# Patient Record
Sex: FEMALE | Race: WHITE | NOT HISPANIC OR LATINO | Employment: PART TIME | URBAN - METROPOLITAN AREA
[De-identification: names, ages, dates, MRNs, and addresses within clinical notes are randomized per-mention and may not be internally consistent; named-entity substitution may affect disease eponyms.]

---

## 2018-01-04 ENCOUNTER — APPOINTMENT (OUTPATIENT)
Dept: MEDICAL GROUP | Facility: MEDICAL CENTER | Age: 63
End: 2018-01-04
Payer: COMMERCIAL

## 2018-01-10 ENCOUNTER — OFFICE VISIT (OUTPATIENT)
Dept: MEDICAL GROUP | Facility: MEDICAL CENTER | Age: 63
End: 2018-01-10
Payer: COMMERCIAL

## 2018-01-10 VITALS
RESPIRATION RATE: 14 BRPM | HEART RATE: 75 BPM | DIASTOLIC BLOOD PRESSURE: 98 MMHG | WEIGHT: 167.4 LBS | HEIGHT: 64 IN | TEMPERATURE: 98.2 F | OXYGEN SATURATION: 98 % | SYSTOLIC BLOOD PRESSURE: 134 MMHG | BODY MASS INDEX: 28.58 KG/M2

## 2018-01-10 DIAGNOSIS — R53.83 FATIGUE, UNSPECIFIED TYPE: ICD-10-CM

## 2018-01-10 DIAGNOSIS — Z12.31 SCREENING MAMMOGRAM, ENCOUNTER FOR: ICD-10-CM

## 2018-01-10 DIAGNOSIS — I10 ESSENTIAL HYPERTENSION: ICD-10-CM

## 2018-01-10 DIAGNOSIS — K58.0 IRRITABLE BOWEL SYNDROME WITH DIARRHEA: ICD-10-CM

## 2018-01-10 DIAGNOSIS — Z87.19 HISTORY OF ULCERATIVE COLITIS: ICD-10-CM

## 2018-01-10 PROCEDURE — 99204 OFFICE O/P NEW MOD 45 MIN: CPT | Performed by: FAMILY MEDICINE

## 2018-01-10 RX ORDER — CHLORTHALIDONE 25 MG/1
25 TABLET ORAL DAILY
Qty: 90 TAB | Refills: 3 | Status: SHIPPED | OUTPATIENT
Start: 2018-01-10 | End: 2019-04-25

## 2018-01-10 ASSESSMENT — PATIENT HEALTH QUESTIONNAIRE - PHQ9: CLINICAL INTERPRETATION OF PHQ2 SCORE: 0

## 2018-01-10 NOTE — LETTER
[facility]  Monae Daniels M.D.  [address]  [place] [address]  Fax: [phone]   Authorization for Release/Disclosure of   Protected Health Information   Name: HOANG GOMEZ : 1955 SSN: [id number]   Address: [address] [place] [address] Phone:    [phone] (home)    I authorize the entity listed below to release/disclose the PHI below to:   [facility]/Monae Daniels M.D. and Monae Daniels M.D.   Provider or Entity Name:  [facility]   Address   [facility], Zip            [address], [place] [address] Phone:  [phone]      Fax:       [phone]          Reason for request: continuity of care   Information to be released:    [ X ] LAST COLONOSCOPY,  including any PATH REPORT and follow-up  [ X ] LAST FIT/COLOGUARD RESULT [  ] LAST DEXA  [  ] LAST MAMMOGRAM  [  ] LAST PAP  [  ] LAST LABS [  ] RETINA EXAM REPORT  [  ] IMMUNIZATION RECORDS  [  ] Release all info      [  ] Check here and initial the line next to each item to release ALL health information INCLUDING  _____ Care and treatment for drug and / or alcohol abuse  _____ HIV testing, infection status, or AIDS  _____ Genetic Testing    DATES OF SERVICE OR TIME PERIOD TO BE DISCLOSED: _____________  I understand and acknowledge that:  * This Authorization may be revoked at any time by you in writing, except if your health information has already been used or disclosed.  * Your health information that will be used or disclosed as a result of you signing this authorization could be re-disclosed by the recipient. If this occurs, your re-disclosed health information may no longer be protected by State or Federal laws.  * You may refuse to sign this Authorization. Your refusal will not affect your ability to obtain treatment.  * This Authorization becomes effective upon signing and will  on (date) __________.      If no date is indicated, this Authorization will  one (1) year  from the signature date.    Name: Elin Ambriz    Signature:   Date:     1/10/2018       PLEASE FAX REQUESTED RECORDS BACK TO: [phone]

## 2018-01-11 NOTE — ASSESSMENT & PLAN NOTE
"This is a new problem. She does not have a recent primary care provider and has never been treated for this. She has not taken any home pressures, but she describes some dizziness and \"funny feeling\" in her head as well as shortness of breath with walking or going up stairs. She denies any chest pain, pressure, jaw pain or arm pain.    She reports feelings of anxiety and fatigue that have been going on for the same time period.   "

## 2018-01-11 NOTE — ASSESSMENT & PLAN NOTE
This is a chronic problem that has been going on for about one year. 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 She reports that she has some days that she does not want to get out of her pajamas. She still works full time in a job that requires her to be on her feet.

## 2018-01-11 NOTE — PROGRESS NOTES
"CC: Establish care and discuss the following chronic conditions: Hypertension, fatigue.      History of Present Illness: This is a 62 y.o. female new patient who presents today to discuss the chronic conditions outlined below. She reports that she has not had a primary care provider in many years because she has not had health insurance. She is now paying to be added on to her 's health insurance plan as she was concerned about her own blood pressure as well as feelings of fatigue.    Essential hypertension  This is a new problem. She does not have a recent primary care provider and has never been treated for this. She has not taken any home pressures, but she describes some dizziness and \"funny feeling\" in her head as well as shortness of breath with walking or going up stairs. She denies any chest pain, pressure, jaw pain or arm pain.    She reports feelings of anxiety and fatigue that have been going on for the same time period.     Fatigue  This is a chronic problem that has been going on for about one year. 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 She reports that she has some days that she does not want to get out of her pajamas. She still works full time in a job that requires her to be on her feet.       Patient Active Problem List    Diagnosis Date Noted   • Essential hypertension 01/10/2018   • History of ulcerative colitis 01/10/2018   • Irritable bowel syndrome with diarrhea 01/10/2018   • Fatigue 01/10/2018      Allergies:Patient has no known allergies.    Current Outpatient Prescriptions   Medication Sig Dispense Refill   • chlorthalidone (HYGROTON) 25 MG Tab Take 1 Tab by mouth every day. 90 Tab 3     No current facility-administered medications for this visit.        Social History   Substance Use Topics   • Smoking status: Never Smoker   • Smokeless tobacco: Never Used   • Alcohol use No     Social History     Social History " "Narrative   • No narrative on file       Family History   Problem Relation Age of Onset   • Cancer Mother      lymphoma   • Psychiatry Father      depression, PTSD   • Heart Disease Father    • Stroke Father    • Breast Cancer Maternal Aunt 70       Review of Systems:   Constitutional: Positive for fatigue. Negative for fever, chills, unexpected weight change, and fatigue/malaise and generalized weakness.   HEENT: Negative for headaches, vision changes, hearing changes, ear pain, ear discharge, rhinorrhea, sinus congestion, sore throat, and neck pain.   Respiratory: Positive for dyspnea with exertion. Negative for cough, sputum production, chest congestion, wheezing, and crackles.   Cardiovascular: Negative for chest pain, palpitations, orthopnea, and bilateral lower extremity edema.   Gastrointestinal: Negative for heartburn, nausea, vomiting, abdominal pain, hematochezia, melena, diarrhea, constipation, and greasy/foul-smelling stools.   Genitourinary: Negative for dysuria, polyuria, hematuria, pyuria, urinary urgency, and urinary incontinence.  Musculoskeletal: Negative for myalgias, back pain, and joint pain.   Skin: Negative for rash, itching, cyanotic skin color change.   Neurological: Positive for feelings of \"internal tremors\" when she wakes up from a dream, dizziness. Negative for tingling, tremors, focal sensory deficit, focal weakness and headaches.   Endo/Heme/Allergies: Does not bruise/bleed easily.   Psychiatric/Behavioral: Negative for depression, suicidal/homicidal ideation and memory loss.        - NOTE: All other systems reviewed and are negative, except as in HPI.      Exam:    Blood pressure 134/98, pulse 75, temperature 36.8 °C (98.2 °F), resp. rate 14, height 1.626 m (5' 4\"), weight 75.9 kg (167 lb 6.4 oz), SpO2 98 %, not currently breastfeeding.   Body mass index is 28.73 kg/m².    General: Normal appearing. No distress.  HEENT: Normocephalic. Conjunctiva clear, lids without ptosis, pupils " "equal and reactive to light, ears normal shape and contour, canals are clear bilaterally, tympanic membranes are benign, nasal mucosa benign, oropharynx is without erythema, edema or exudates.   Neck: Supple without JVD or bruit. Thyroid is not enlarged.  Pulmonary: Clear to ausculation.  Normal effort. No rales, ronchi, or wheezing.  Cardiovascular: Regular rate and rhythm without murmur. Carotid and radial pulses are intact and equal bilaterally.  Abdomen: Soft, nontender, nondistended. Normal bowel sounds. Liver and spleen are not palpable  Neurologic: Grossly non-focal, DTRs intact  Lymph: No cervical, supraclavicular or axillary lymph nodes are palpable  Skin: Warm and dry.  No obvious lesions.  Musculoskeletal: Normal gait. No extremity cyanosis, clubbing, or edema.  Psych: Normal mood and affect. Alert and oriented x3. Judgment and insight is normal.      EKG Interpretation   Interpreted by me   Rhythm: normal sinus   Rate: normal   Axis: normal   Ectopy: none   Conduction: normal   ST Segments: no acute change   T Waves: no acute change   Q Waves: none   Clinical Impression: no acute changes, normal EKG    Health Maintenance: Mammogram ordered today, she had a colonoscopy and EGD done at [facility] in 2015. We will request those records today.    Assessment/Plan:  1. Essential hypertension  This is a new problem that has not previously been managed. EKG done in the clinic today shows no acute changes. We will start her on a daily blood pressure control medication today and follow-up in 4 weeks.  - COMP METABOLIC PANEL; Future  - EKG - Clinic performed  - LIPID PROFILE; Future  -chlorthalidone (HYGROTON) 25 MG Tab once daily    2. History of ulcerative colitis  This is a chronic problem that \"resolved\" per colonoscopy done in 2015 at [facility]. She reports no further problems with this.    3. Irritable bowel syndrome with diarrhea  This is a chronic problem that is controlled with current " "lifestyle measures. She reports that she will occasionally have \"attacks\" but these are infrequent.    4. Fatigue, unspecified type  This is a chronic problem that is uncontrolled with current lifestyle measures. She denies any family history of thyroid problems in does not think that her thyroid has ever been investigated.   - TSH WITH REFLEX TO FT4; Future    5. Screening mammogram, encounter for  - MA-SCREEN MAMMO W/CAD-BILAT; Future    Vicky Saez PA-C and I completed this visit and chart together as part of her training in [employer].  Monae Daniels MD  Provider [place]/educator  [facility]        Please note that this dictation was created using voice recognition software. I have made every reasonable attempt to correct obvious errors, but I expect that there are errors of grammar and possibly content that I did not discover before finalizing the note.       "

## 2018-01-12 ENCOUNTER — HOSPITAL ENCOUNTER (OUTPATIENT)
Dept: LAB | Facility: MEDICAL CENTER | Age: 63
End: 2018-01-12
Attending: FAMILY MEDICINE
Payer: COMMERCIAL

## 2018-01-12 DIAGNOSIS — I10 ESSENTIAL HYPERTENSION: ICD-10-CM

## 2018-01-12 DIAGNOSIS — R53.83 FATIGUE, UNSPECIFIED TYPE: ICD-10-CM

## 2018-01-12 LAB
ALBUMIN SERPL BCP-MCNC: 4.3 G/DL (ref 3.2–4.9)
ALBUMIN/GLOB SERPL: 1.6 G/DL
ALP SERPL-CCNC: 75 U/L (ref 30–99)
ALT SERPL-CCNC: 15 U/L (ref 2–50)
ANION GAP SERPL CALC-SCNC: 4 MMOL/L (ref 0–11.9)
AST SERPL-CCNC: 15 U/L (ref 12–45)
BILIRUB SERPL-MCNC: 0.9 MG/DL (ref 0.1–1.5)
BUN SERPL-MCNC: 18 MG/DL (ref 8–22)
CALCIUM SERPL-MCNC: 9.2 MG/DL (ref 8.5–10.5)
CHLORIDE SERPL-SCNC: 104 MMOL/L (ref 96–112)
CHOLEST SERPL-MCNC: 284 MG/DL (ref 100–199)
CO2 SERPL-SCNC: 28 MMOL/L (ref 20–33)
CREAT SERPL-MCNC: 0.59 MG/DL (ref 0.5–1.4)
GLOBULIN SER CALC-MCNC: 2.7 G/DL (ref 1.9–3.5)
GLUCOSE SERPL-MCNC: 88 MG/DL (ref 65–99)
HDLC SERPL-MCNC: 71 MG/DL
LDLC SERPL CALC-MCNC: 194 MG/DL
POTASSIUM SERPL-SCNC: 4.2 MMOL/L (ref 3.6–5.5)
PROT SERPL-MCNC: 7 G/DL (ref 6–8.2)
SODIUM SERPL-SCNC: 136 MMOL/L (ref 135–145)
TRIGL SERPL-MCNC: 95 MG/DL (ref 0–149)
TSH SERPL DL<=0.005 MIU/L-ACNC: 1.74 UIU/ML (ref 0.38–5.33)

## 2018-01-12 PROCEDURE — 80061 LIPID PANEL: CPT

## 2018-01-12 PROCEDURE — 84443 ASSAY THYROID STIM HORMONE: CPT

## 2018-01-12 PROCEDURE — 80053 COMPREHEN METABOLIC PANEL: CPT

## 2018-01-12 PROCEDURE — 36415 COLL VENOUS BLD VENIPUNCTURE: CPT

## 2018-01-17 ENCOUNTER — HOSPITAL ENCOUNTER (OUTPATIENT)
Dept: RADIOLOGY | Facility: MEDICAL CENTER | Age: 63
End: 2018-01-17

## 2018-01-18 ENCOUNTER — HOSPITAL ENCOUNTER (OUTPATIENT)
Dept: RADIOLOGY | Facility: MEDICAL CENTER | Age: 63
End: 2018-01-18
Attending: FAMILY MEDICINE
Payer: COMMERCIAL

## 2018-01-18 DIAGNOSIS — Z12.31 SCREENING MAMMOGRAM, ENCOUNTER FOR: ICD-10-CM

## 2018-01-18 PROCEDURE — 77067 SCR MAMMO BI INCL CAD: CPT

## 2018-02-12 ENCOUNTER — OFFICE VISIT (OUTPATIENT)
Dept: MEDICAL GROUP | Facility: MEDICAL CENTER | Age: 63
End: 2018-02-12
Payer: COMMERCIAL

## 2018-02-12 VITALS
HEIGHT: 64 IN | WEIGHT: 168.6 LBS | HEART RATE: 60 BPM | TEMPERATURE: 97.2 F | DIASTOLIC BLOOD PRESSURE: 70 MMHG | OXYGEN SATURATION: 97 % | RESPIRATION RATE: 14 BRPM | SYSTOLIC BLOOD PRESSURE: 116 MMHG | BODY MASS INDEX: 28.79 KG/M2

## 2018-02-12 DIAGNOSIS — E78.00 HYPERCHOLESTEROLEMIA: ICD-10-CM

## 2018-02-12 DIAGNOSIS — I10 ESSENTIAL HYPERTENSION: ICD-10-CM

## 2018-02-12 DIAGNOSIS — K58.0 IRRITABLE BOWEL SYNDROME WITH DIARRHEA: ICD-10-CM

## 2018-02-12 PROCEDURE — 99214 OFFICE O/P EST MOD 30 MIN: CPT | Performed by: FAMILY MEDICINE

## 2018-02-12 RX ORDER — ATORVASTATIN CALCIUM 20 MG/1
20 TABLET, FILM COATED ORAL DAILY
Qty: 90 TAB | Refills: 3 | Status: SHIPPED | OUTPATIENT
Start: 2018-02-12 | End: 2019-04-25

## 2018-02-12 NOTE — ASSESSMENT & PLAN NOTE
This is a chronic problem that recently has been worse. Patient states that she is having increasing gas along with the loose stool. We talked about utilizing probiotics to see if that would be helpful. She is going to give that a try. She is also given a try and make some lifestyle changes to help her cholesterol

## 2018-02-12 NOTE — PROGRESS NOTES
CC: Hypertension and hypercholesterolemia    HISTORY OF PRESENT ILLNESS: Patient is a 62 y.o. female established patient who presents today to discuss her chronic health problems as outlined below. Patient and her blood work prior to the visit. She does tell me that since she started on the chlorthalidone that her blood pressures have been excellent. She did look at her lab results and is wondering about trying to get her cholesterol under control to dietary changes. We will give her some information to read.    Health Maintenance: Patient will do her fit test    Essential hypertension  This is a chronic health problem that is uncontrolled with current medications and lifestyle measures.  The patient denies chest pain, shortness of breath or dyspnea on exertion.            Hypercholesterolemia  This is a chronic health problem that was found and the patient did her blood work. We did a lipid panel because she has hypertension and her total cholesterol comes back elevated at 284, triglycerides 95, HDL excellent at 71 but her LDL is elevated 194. We talked about food choices. She admits that she likes creamy sayings such as half-and-half in her coffee cheese etc. We talked about the fact that she has to make some lifestyle changes which should also include walking for at least 30 minutes a day eating more chicken and fish, and starting a statin at this time. We will do it for the next 3 months to recheck her blood work at the end of that time. We will also have her start taking a baby aspirin daily    Irritable bowel syndrome with diarrhea  This is a chronic problem that recently has been worse. Patient states that she is having increasing gas along with the loose stool. We talked about utilizing probiotics to see if that would be helpful. She is going to give that a try. She is also given a try and make some lifestyle changes to help her cholesterol      Patient Active Problem List    Diagnosis Date Noted   •  Hypercholesterolemia 02/12/2018   • Essential hypertension 01/10/2018   • History of ulcerative colitis 01/10/2018   • Irritable bowel syndrome with diarrhea 01/10/2018   • Fatigue 01/10/2018      Allergies:Patient has no known allergies.    Current Outpatient Prescriptions   Medication Sig Dispense Refill   • atorvastatin (LIPITOR) 20 MG Tab Take 1 Tab by mouth every day. 90 Tab 3   • aspirin EC (ECOTRIN) 81 MG Tablet Delayed Response Take 1 Tab by mouth every day.     • chlorthalidone (HYGROTON) 25 MG Tab Take 1 Tab by mouth every day. 90 Tab 3     No current facility-administered medications for this visit.        Social History   Substance Use Topics   • Smoking status: Never Smoker   • Smokeless tobacco: Never Used   • Alcohol use No     Social History     Social History Narrative   • No narrative on file       Family History   Problem Relation Age of Onset   • Cancer Mother      lymphoma   • Psychiatry Father      depression, PTSD   • Heart Disease Father    • Stroke Father    • Breast Cancer Maternal Aunt 70       Review of Systems:      - Constitutional: Negative for fever, chills, unexpected weight change, and fatigue/generalized weakness.     - HEENT: Negative for headaches, vision changes, hearing changes, ear pain, ear discharge, rhinorrhea, sinus congestion, sore throat, and neck pain.      - Respiratory: Negative for cough, sputum production, chest congestion, dyspnea, wheezing, and crackles.      - Cardiovascular: Negative for chest pain, palpitations, orthopnea, and bilateral lower extremity edema.     - Gastrointestinal: Increasing problem with flatulence and continued irritable bowel with loose stool      - Genitourinary: Negative for dysuria, polyuria, hematuria, pyuria, urinary urgency, and urinary incontinence.    - Musculoskeletal: Negative for myalgias, back pain, and joint pain.     - Skin: Negative for rash, itching, cyanotic skin color change.     - Neurological: Negative for dizziness,  "tingling, tremors, focal sensory deficit, focal weakness and headaches.     - Endo/Heme/Allergies: Does not bruise/bleed easily.     - Psychiatric/Behavioral: Negative for depression, suicidal/homicidal ideation and memory loss.          - NOTE: All other systems reviewed and are negative, except as in HPI.    Exam:    Blood pressure 116/70, pulse 60, temperature 36.2 °C (97.2 °F), resp. rate 14, height 1.626 m (5' 4\"), weight 76.5 kg (168 lb 9.6 oz), SpO2 97 %, not currently breastfeeding. Body mass index is 28.94 kg/m².    General:  Well nourished, well developed female in NAD  LABS: 1/12/18: Results reviewed and discussed with the patient, questions answered.    Patient was seen for 25 minutes face to face of which, 20 minutes was spent counseling regarding the above mentioned problems.  Assessment/Plan:  1. Essential hypertension  Controlled, continue with current meds and lifestyle.      2. Hypercholesterolemia  This problem is uncontrolled and we discussed that she could work on dietary management. While she is working on that and wanted her to start on a statin as well as a daily 81 mg aspirin. She is willing to do this. We will see her back in 3 months with labs taken prior to the visit.  - COMP METABOLIC PANEL; Future  - LIPID PROFILE; Future    3. Irritable bowel syndrome with diarrhea  Patient going to try adding a probiotic over-the-counter to see if this helps with the flatulence and the IBS.        "

## 2018-02-12 NOTE — ASSESSMENT & PLAN NOTE
This is a chronic health problem that was found and the patient did her blood work. We did a lipid panel because she has hypertension and her total cholesterol comes back elevated at 284, triglycerides 95, HDL excellent at 71 but her LDL is elevated 194. We talked about food choices. She admits that she likes creamy sayings such as half-and-half in her coffee cheese etc. We talked about the fact that she has to make some lifestyle changes which should also include walking for at least 30 minutes a day eating more chicken and fish, and starting a statin at this time. We will do it for the next 3 months to recheck her blood work at the end of that time. We will also have her start taking a baby aspirin daily

## 2018-04-24 ENCOUNTER — OFFICE VISIT (OUTPATIENT)
Dept: MEDICAL GROUP | Facility: MEDICAL CENTER | Age: 63
End: 2018-04-24
Payer: COMMERCIAL

## 2018-04-24 VITALS
OXYGEN SATURATION: 97 % | HEART RATE: 68 BPM | DIASTOLIC BLOOD PRESSURE: 68 MMHG | SYSTOLIC BLOOD PRESSURE: 104 MMHG | WEIGHT: 171.96 LBS | HEIGHT: 64 IN | TEMPERATURE: 98.1 F | BODY MASS INDEX: 29.36 KG/M2

## 2018-04-24 DIAGNOSIS — G57.60 MORTON'S NEUROMA, UNSPECIFIED LATERALITY: ICD-10-CM

## 2018-04-24 DIAGNOSIS — M25.562 CHRONIC PAIN OF LEFT KNEE: ICD-10-CM

## 2018-04-24 DIAGNOSIS — H91.91 DECREASED HEARING OF RIGHT EAR: ICD-10-CM

## 2018-04-24 DIAGNOSIS — G89.29 CHRONIC PAIN OF LEFT KNEE: ICD-10-CM

## 2018-04-24 PROCEDURE — 99214 OFFICE O/P EST MOD 30 MIN: CPT | Performed by: PHYSICIAN ASSISTANT

## 2018-04-24 NOTE — ASSESSMENT & PLAN NOTE
This is a 62-year-old female who is here today to discuss decreased hearing of her bilateral ears but specifically the right ear. She was seen at [facility] and told she needed wax removed. She been putting some baby oil in it. Denies any ear pain.

## 2018-04-24 NOTE — ASSESSMENT & PLAN NOTE
She also complains of a chronic history of a least 10 years of left knee pain. Recently the pain as gotten worse. There is swelling in the medial aspect of the knee. It does improve in time. She limps. She denies any buckling. No medication is effective.

## 2018-04-24 NOTE — PROGRESS NOTES
Subjective:   Elin Ambriz is a 62 y.o. female here today for decreased hearing out of her right ear as well as chronic left knee pain.    Decreased hearing of right ear  This is a 62-year-old female who is here today to discuss decreased hearing of her bilateral ears but specifically the right ear. She was seen at [facility] and told she needed wax removed. She been putting some baby oil in it. Denies any ear pain.    Pagan's neuroma, unspecified laterality  Also complains of a chronic history of bilateral feet pain secondary to Pagan's neuroma. She saw podiatry in [place] and had injections a few weeks ago which actually causes pain. He has not been alleviated. She states it is difficult to walk. She is gaining weight because of that.    Chronic pain of left knee  She also complains of a chronic history of a least 10 years of left knee pain. Recently the pain as gotten worse. There is swelling in the medial aspect of the knee. It does improve in time. She limps. She denies any buckling. No medication is effective.       Current medicines (including changes today)  Current Outpatient Prescriptions   Medication Sig Dispense Refill   • atorvastatin (LIPITOR) 20 MG Tab Take 1 Tab by mouth every day. 90 Tab 3   • chlorthalidone (HYGROTON) 25 MG Tab Take 1 Tab by mouth every day. 90 Tab 3   • aspirin EC (ECOTRIN) 81 MG Tablet Delayed Response Take 1 Tab by mouth every day.       No current facility-administered medications for this visit.      She  has a past medical history of Essential hypertension (1/10/2018); Fatigue (1/10/2018); History of ulcerative colitis (1/10/2018); Hypercholesterolemia (2/12/2018); and Irritable bowel syndrome with diarrhea (1/10/2018).    Social History and Family History were reviewed and updated.    ROS   No chest pain, no shortness of breath, no abdominal pain and all other systems were reviewed and are negative.       Objective:     Blood pressure 104/68, pulse 68, temperature 36.7  "°C (98.1 °F), height 1.626 m (5' 4\"), weight 78 kg (171 lb 15.3 oz), SpO2 97 %. Body mass index is 29.52 kg/m².   Physical Exam:  Constitutional: Alert, no distress.  Skin: Warm, dry, good turgor, no rashes in visible areas.  Eye: Equal, round and reactive, conjunctiva clear, lids normal.  ENMT: Lips without lesions, good dentition, oropharynx clear. Right ear with cerumen impaction that was removed successfully with a lavage revealing a clear TM.  Neck: Trachea midline, no masses.   Lymph: No cervical or supraclavicular lymphadenopathy  Respiratory: Unlabored respiratory effort, lungs clear to auscultation, no wheezes, no ronchi.  Cardiovascular: Normal S1, S2, no murmur, no edema.  Musculoskeletal: Bilateral knees appear symmetrical except there is a slight area of a possible effusion of the left knee on the medial aspect. Range of motion is good.  Psych: Alert and oriented x3, normal affect and mood.        Assessment and Plan:   The following treatment plan was discussed    1. Decreased hearing of right ear  Acute, new onset condition. Lavage successfully removed earwax. Advised to follow up annually to have ears evaluated. May return to [facility]. She has hearing aids advised may pack a wax in the right ear. Follow-up as needed.    2. Pagan's neuroma, unspecified laterality  Chronic condition. Uncontrolled. Referred to podiatry and Dr. Meier to establish care.  - REFERRAL TO PODIATRY    3. Chronic pain of left knee  Newly placed on problem list but a chronic condition. Referred to orthopedics to establish care. If she may have some internal derangement of the left knee.  - REFERRAL TO ORTHOPEDICS    Total 25 minutes face-to-face time spent with patient, with greater than 50% of the total time discussing patient's issues and symptoms as listed above in assessment and plan, as well as managing coordination of care for future evaluation and treatment of cerumen impaction, left knee pain and bilateral Pagan's " neuromas..      Followup: Return if symptoms worsen or fail to improve.    Please note that this dictation was created using voice recognition software. I have made every reasonable attempt to correct obvious errors, but I expect that there are errors of grammar and possibly content that I did not discover before finalizing the note.

## 2018-04-24 NOTE — ASSESSMENT & PLAN NOTE
Also complains of a chronic history of bilateral feet pain secondary to Pagan's neuroma. She saw podiatry in [place] and had injections a few weeks ago which actually causes pain. He has not been alleviated. She states it is difficult to walk. She is gaining weight because of that.

## 2018-04-24 NOTE — LETTER
[facility]  Monae Daniels M.D.  [address]  [place] [address]  Fax: [phone]   Authorization for Release/Disclosure of   Protected Health Information   Name: HOANG AMBRIZ : 1955 SSN: [id number]   Address: [address] [place] [address] [address]  [place] [address] Phone:    [phone] (home)    I authorize the entity listed below to release/disclose the PHI below to:   [facility]/Monae Daniels M.D. and Franco Fitzgerald P.A.-C.   Provider or Entity Name:  [facility]   Address   City, State, Zip   Phone:      Fax:     Reason for request: continuity of care   Information to be released:    [ X ] LAST COLONOSCOPY,  including any PATH REPORT and follow-up  [  ] LAST FIT/COLOGUARD RESULT [  ] LAST DEXA  [  ] LAST MAMMOGRAM  [  ] LAST PAP  [  ] LAST LABS [  ] RETINA EXAM REPORT  [  ] IMMUNIZATION RECORDS  [  ] Release all info      [  ] Check here and initial the line next to each item to release ALL health information INCLUDING  _____ Care and treatment for drug and / or alcohol abuse  _____ HIV testing, infection status, or AIDS  _____ Genetic Testing    DATES OF SERVICE OR TIME PERIOD TO BE DISCLOSED: _____________  I understand and acknowledge that:  * This Authorization may be revoked at any time by you in writing, except if your health information has already been used or disclosed.  * Your health information that will be used or disclosed as a result of you signing this authorization could be re-disclosed by the recipient. If this occurs, your re-disclosed health information may no longer be protected by State or Federal laws.  * You may refuse to sign this Authorization. Your refusal will not affect your ability to obtain treatment.  * This Authorization becomes effective upon signing and will  on (date) __________.      If no date is indicated, this Authorization will  one (1) year from the signature date.    Name: Hoang Ambriz    Signature:   Date:     2018       PLEASE FAX  REQUESTED RECORDS BACK TO: [phone]

## 2018-07-10 ENCOUNTER — HOSPITAL ENCOUNTER (OUTPATIENT)
Dept: LAB | Facility: MEDICAL CENTER | Age: 63
End: 2018-07-10
Attending: ANESTHESIOLOGY
Payer: COMMERCIAL

## 2018-07-10 LAB
ANION GAP SERPL CALC-SCNC: 9 MMOL/L (ref 0–11.9)
BUN SERPL-MCNC: 15 MG/DL (ref 8–22)
CALCIUM SERPL-MCNC: 9.5 MG/DL (ref 8.5–10.5)
CHLORIDE SERPL-SCNC: 104 MMOL/L (ref 96–112)
CO2 SERPL-SCNC: 25 MMOL/L (ref 20–33)
CREAT SERPL-MCNC: 0.68 MG/DL (ref 0.5–1.4)
GLUCOSE SERPL-MCNC: 74 MG/DL (ref 65–99)
HCT VFR BLD AUTO: 43.6 % (ref 37–47)
POTASSIUM SERPL-SCNC: 4.3 MMOL/L (ref 3.6–5.5)
SODIUM SERPL-SCNC: 138 MMOL/L (ref 135–145)

## 2018-07-10 PROCEDURE — 80048 BASIC METABOLIC PNL TOTAL CA: CPT

## 2018-07-10 PROCEDURE — 85014 HEMATOCRIT: CPT

## 2018-07-10 PROCEDURE — 36415 COLL VENOUS BLD VENIPUNCTURE: CPT

## 2018-08-03 ENCOUNTER — HOSPITAL ENCOUNTER (OUTPATIENT)
Dept: LAB | Facility: MEDICAL CENTER | Age: 63
End: 2018-08-03
Attending: FAMILY MEDICINE
Payer: COMMERCIAL

## 2018-08-03 DIAGNOSIS — E78.00 HYPERCHOLESTEROLEMIA: ICD-10-CM

## 2018-08-03 LAB
ALBUMIN SERPL BCP-MCNC: 4.4 G/DL (ref 3.2–4.9)
ALBUMIN/GLOB SERPL: 1.7 G/DL
ALP SERPL-CCNC: 68 U/L (ref 30–99)
ALT SERPL-CCNC: 11 U/L (ref 2–50)
ANION GAP SERPL CALC-SCNC: 7 MMOL/L (ref 0–11.9)
AST SERPL-CCNC: 18 U/L (ref 12–45)
BILIRUB SERPL-MCNC: 1.2 MG/DL (ref 0.1–1.5)
BUN SERPL-MCNC: 21 MG/DL (ref 8–22)
CALCIUM SERPL-MCNC: 9.4 MG/DL (ref 8.5–10.5)
CHLORIDE SERPL-SCNC: 106 MMOL/L (ref 96–112)
CHOLEST SERPL-MCNC: 242 MG/DL (ref 100–199)
CO2 SERPL-SCNC: 26 MMOL/L (ref 20–33)
CREAT SERPL-MCNC: 0.71 MG/DL (ref 0.5–1.4)
GLOBULIN SER CALC-MCNC: 2.6 G/DL (ref 1.9–3.5)
GLUCOSE SERPL-MCNC: 81 MG/DL (ref 65–99)
HDLC SERPL-MCNC: 69 MG/DL
LDLC SERPL CALC-MCNC: 158 MG/DL
POTASSIUM SERPL-SCNC: 4.2 MMOL/L (ref 3.6–5.5)
PROT SERPL-MCNC: 7 G/DL (ref 6–8.2)
SODIUM SERPL-SCNC: 139 MMOL/L (ref 135–145)
TRIGL SERPL-MCNC: 74 MG/DL (ref 0–149)

## 2018-08-03 PROCEDURE — 80053 COMPREHEN METABOLIC PANEL: CPT

## 2018-08-03 PROCEDURE — 80061 LIPID PANEL: CPT

## 2018-08-03 PROCEDURE — 36415 COLL VENOUS BLD VENIPUNCTURE: CPT

## 2018-08-13 ENCOUNTER — APPOINTMENT (OUTPATIENT)
Dept: MEDICAL GROUP | Facility: MEDICAL CENTER | Age: 63
End: 2018-08-13
Payer: COMMERCIAL

## 2018-08-16 ENCOUNTER — HOSPITAL ENCOUNTER (OUTPATIENT)
Facility: MEDICAL CENTER | Age: 63
End: 2018-08-16
Attending: OBSTETRICS & GYNECOLOGY
Payer: COMMERCIAL

## 2018-08-16 ENCOUNTER — GYNECOLOGY VISIT (OUTPATIENT)
Dept: OBGYN | Facility: MEDICAL CENTER | Age: 63
End: 2018-08-16
Payer: COMMERCIAL

## 2018-08-16 VITALS
SYSTOLIC BLOOD PRESSURE: 119 MMHG | HEIGHT: 64 IN | DIASTOLIC BLOOD PRESSURE: 80 MMHG | BODY MASS INDEX: 28.51 KG/M2 | WEIGHT: 167 LBS

## 2018-08-16 DIAGNOSIS — Z11.51 SCREENING FOR HUMAN PAPILLOMAVIRUS (HPV): ICD-10-CM

## 2018-08-16 DIAGNOSIS — Z12.4 PAP SMEAR FOR CERVICAL CANCER SCREENING: ICD-10-CM

## 2018-08-16 DIAGNOSIS — Z01.419 WELL WOMAN EXAM WITH ROUTINE GYNECOLOGICAL EXAM: ICD-10-CM

## 2018-08-16 PROCEDURE — 99386 PREV VISIT NEW AGE 40-64: CPT | Performed by: OBSTETRICS & GYNECOLOGY

## 2018-08-16 PROCEDURE — 88175 CYTOPATH C/V AUTO FLUID REDO: CPT

## 2018-08-16 PROCEDURE — 87624 HPV HI-RISK TYP POOLED RSLT: CPT

## 2018-08-16 NOTE — PROGRESS NOTES
"Subjective:      Elin Ambriz is a 62 y.o. female who presents with New Patient (Annual exam)        CC: annual exam    HPI: 61 yo  postmenopausal female presents for annual exam.  No complaints today.  Last mammogram  - wnl.  Last pap ? 10 years ago.  No hx of abnormal pap smears.     ROS REVIEW OF SYSTEMS:    Pertinent positives and negatives mentioned in HPI.    All other systems reviewed and are negative or noncontributory.       Objective:     /80   Ht 1.626 m (5' 4\")   Wt 75.8 kg (167 lb)   Breastfeeding? No   BMI 28.67 kg/m²      Physical Exam      GENERAL: Alert, in no apparent distress  PSYCHIATRIC: Appropriate affect, intact insight and judgement.  NECK:  Nontender, no masses.  No Thyromegaly or nodules. No lymphadenopathy.  RESPIRATORY: Normal respiratory effort.  Lungs clear to auscultation.   CARDIOVASCULAR: RRR, no murmur, gallop, or rub.  ABDOMEN: Soft, nontender, nondistended.  No palpable masses.  No rebound or guarding.  No inguinal lymphadenopathy.  No hepatosplenomegaly.  No hernias.  BACK: No CVA tenderness  EXTREMITIES: No edema  SKIN: No rash    BREAST: No masses or tenderness.  No skin changes.  No nipple inversion or discharge. No axillary lymphadenopathy.      GENITOURINARY:  Normal external genitalia, no lesions.  Normal urethral meatus, no masses or tenderness.  Normal bladder without fullness or masses.  Vagina atrophic, no vaginal discharge or lesions.  Cervix without lesions or discharge, nontender.  Uterus normal size, shape, and contour, nontender.  Adnexa nontender, no masses.  Normal anus and perineum.    Rectal Exam - not indicated.       Assessment/Plan:     1. Well woman exam with routine gynecological exam  Reviewed monthly self breast exams and need for yearly mammograms starting at age 40.  Discussed calcium intake, Vitamin D,  and weight bearing exercise for bone health.  Mammogram current.   - ThinPrep Pap, w/HPV rflx to genotype; Future    2. " Screening for human papillomavirus (HPV)  - ThinPrep Pap, w/HPV rflx to genotype; Future    3. Pap smear for cervical cancer screening  - ThinPrep Pap, w/HPV rflx to genotype; Future    F/U prn

## 2018-08-17 ENCOUNTER — OFFICE VISIT (OUTPATIENT)
Dept: MEDICAL GROUP | Facility: MEDICAL CENTER | Age: 63
End: 2018-08-17
Payer: COMMERCIAL

## 2018-08-17 VITALS
WEIGHT: 165.34 LBS | SYSTOLIC BLOOD PRESSURE: 110 MMHG | TEMPERATURE: 97.3 F | BODY MASS INDEX: 28.23 KG/M2 | HEART RATE: 71 BPM | DIASTOLIC BLOOD PRESSURE: 64 MMHG | HEIGHT: 64 IN | OXYGEN SATURATION: 97 %

## 2018-08-17 DIAGNOSIS — Z01.419 WELL WOMAN EXAM WITH ROUTINE GYNECOLOGICAL EXAM: ICD-10-CM

## 2018-08-17 DIAGNOSIS — Z11.51 SCREENING FOR HUMAN PAPILLOMAVIRUS (HPV): ICD-10-CM

## 2018-08-17 DIAGNOSIS — I10 ESSENTIAL HYPERTENSION: ICD-10-CM

## 2018-08-17 DIAGNOSIS — K21.9 GASTROESOPHAGEAL REFLUX DISEASE, ESOPHAGITIS PRESENCE NOT SPECIFIED: ICD-10-CM

## 2018-08-17 DIAGNOSIS — E78.00 HYPERCHOLESTEROLEMIA: ICD-10-CM

## 2018-08-17 DIAGNOSIS — Z12.4 PAP SMEAR FOR CERVICAL CANCER SCREENING: ICD-10-CM

## 2018-08-17 PROBLEM — R53.83 FATIGUE: Status: RESOLVED | Noted: 2018-01-10 | Resolved: 2018-08-17

## 2018-08-17 PROBLEM — G57.60: Status: RESOLVED | Noted: 2018-04-24 | Resolved: 2018-08-17

## 2018-08-17 PROCEDURE — 99214 OFFICE O/P EST MOD 30 MIN: CPT | Performed by: PHYSICIAN ASSISTANT

## 2018-08-17 RX ORDER — OMEPRAZOLE 40 MG/1
40 CAPSULE, DELAYED RELEASE ORAL DAILY
Qty: 90 CAP | Refills: 3 | Status: SHIPPED | OUTPATIENT
Start: 2018-08-17 | End: 2023-09-11

## 2018-08-17 RX ORDER — RANITIDINE 150 MG/1
150 TABLET ORAL
Qty: 30 TAB | Refills: 5 | Status: SHIPPED | OUTPATIENT
Start: 2018-08-17 | End: 2020-12-09

## 2018-08-17 NOTE — ASSESSMENT & PLAN NOTE
Has chronic reflux. He has symptoms also at nighttime. States that coffee exacerbates her symptoms. Even water does. Has been followed by GI. Had an upper endoscopy. Like more information as to when to take the medication.

## 2018-08-17 NOTE — PROGRESS NOTES
Subjective:   Elin Ambriz is a 62 y.o. female here today for hypertension, hypercholesterolemia and chronic GERD.    Hypercholesterolemia  This is a 62-year-old female who comes in today to follow-up on her hyper cholesterolemia. Recent labs showed her . Total cholesterol above 240. HDL 69. Total cholesterol HDL ratio of 3.5. She has Lipitor 20 mg but doesn't take medication every day. Denies any side effects. No muscle pain.    Essential hypertension  Like to know when to take her blood pressure medication. Has chronic hypertension. Takes chlorthalidone 25 mg daily. Denies any chest pain or shortness of breath.    GERD (gastroesophageal reflux disease)  Has chronic reflux. He has symptoms also at nighttime. States that coffee exacerbates her symptoms. Even water does. Has been followed by GI. Had an upper endoscopy. Like more information as to when to take the medication.       Current medicines (including changes today)  Current Outpatient Prescriptions   Medication Sig Dispense Refill   • omeprazole (PRILOSEC) 40 MG delayed-release capsule Take 1 Cap by mouth every day. 1/2 hour prior to same meal. 90 Cap 3   • raNITidine (ZANTAC) 150 MG Tab Take 1 Tab by mouth every bedtime. 1-2 hours prior to bed. 30 Tab 5   • atorvastatin (LIPITOR) 20 MG Tab Take 1 Tab by mouth every day. 90 Tab 3   • chlorthalidone (HYGROTON) 25 MG Tab Take 1 Tab by mouth every day. 90 Tab 3   • aspirin EC (ECOTRIN) 81 MG Tablet Delayed Response Take 1 Tab by mouth every day.       No current facility-administered medications for this visit.      She  has a past medical history of Essential hypertension (1/10/2018); Fatigue (1/10/2018); History of ulcerative colitis (1/10/2018); Hypercholesterolemia (2/12/2018); and Irritable bowel syndrome with diarrhea (1/10/2018).    Social History and Family History were reviewed and updated.    ROS   No chest pain, no shortness of breath, no abdominal pain and all other systems were reviewed  "and are negative.       Objective:     Blood pressure 110/64, pulse 71, temperature 36.3 °C (97.3 °F), height 1.626 m (5' 4\"), weight 75 kg (165 lb 5.5 oz), SpO2 97 %. Body mass index is 28.38 kg/m².   Physical Exam:  Constitutional: Alert, no distress.  Skin: Warm, dry, good turgor, no rashes in visible areas.  Eye: Equal, round and reactive, conjunctiva clear, lids normal.  ENMT: Lips without lesions, good dentition, oropharynx clear.  Neck: Trachea midline, no masses.   Lymph: No cervical or supraclavicular lymphadenopathy  Respiratory: Unlabored respiratory effort, lungs appear clear, no wheezes.  Cardiovascular: Normal S1, S2, no murmur, no edema.  Psych: Alert and oriented x3, normal affect and mood.        Assessment and Plan:   The following treatment plan was discussed    1. Hypercholesterolemia  Chronic condition. Discussed her lipid profile. Advised her total cholesterol HDL ratio is excellent. Advised to take Lipitor daily at 10 mg. She may cut the existing tablets in half. Order lipid panel for 6 weeks to 3 months. May take cholesterol medication at any time during the day.  - LIPID PROFILE; Future    2. Gastroesophageal reflux disease, esophagitis presence not specified  Chronic condition. Uncontrolled. Advised to take omeprazole half hour prior to breakfast in the morning. Advised to stop coffee or change to decaf which she is planning. Instead of taking Tums or an antacid take ranitidine one hour prior to bedtime.  - omeprazole (PRILOSEC) 40 MG delayed-release capsule; Take 1 Cap by mouth every day. 1/2 hour prior to same meal.  Dispense: 90 Cap; Refill: 3  - raNITidine (ZANTAC) 150 MG Tab; Take 1 Tab by mouth every bedtime. 1-2 hours prior to bed.  Dispense: 30 Tab; Refill: 5    3. Essential hypertension  Chronic condition. Controlled. Take blood pressure medication in the morning first thing when she wakes up.    Patient was seen for 25 minutes face to face of which > 50% of appointment time was " spent on counseling and coordination of care regarding the above.    Followup: Return in about 3 months (around 11/17/2018).    Please note that this dictation was created using voice recognition software. I have made every reasonable attempt to correct obvious errors, but I expect that there are errors of grammar and possibly content that I did not discover before finalizing the note.

## 2018-08-17 NOTE — ASSESSMENT & PLAN NOTE
Like to know when to take her blood pressure medication. Has chronic hypertension. Takes chlorthalidone 25 mg daily. Denies any chest pain or shortness of breath.

## 2018-08-17 NOTE — ASSESSMENT & PLAN NOTE
This is a 62-year-old female who comes in today to follow-up on her hyper cholesterolemia. Recent labs showed her . Total cholesterol above 240. HDL 69. Total cholesterol HDL ratio of 3.5. She has Lipitor 20 mg but doesn't take medication every day. Denies any side effects. No muscle pain.

## 2018-08-20 LAB
CYTOLOGY REG CYTOL: NORMAL
HPV HR 12 DNA CVX QL NAA+PROBE: NEGATIVE
HPV16 DNA SPEC QL NAA+PROBE: NEGATIVE
HPV18 DNA SPEC QL NAA+PROBE: NEGATIVE
SPECIMEN SOURCE: NORMAL

## 2018-09-24 ENCOUNTER — OFFICE VISIT (OUTPATIENT)
Dept: MEDICAL GROUP | Facility: MEDICAL CENTER | Age: 63
End: 2018-09-24
Payer: COMMERCIAL

## 2018-09-24 VITALS
WEIGHT: 167.8 LBS | HEIGHT: 64 IN | HEART RATE: 68 BPM | DIASTOLIC BLOOD PRESSURE: 86 MMHG | RESPIRATION RATE: 12 BRPM | OXYGEN SATURATION: 98 % | TEMPERATURE: 98 F | SYSTOLIC BLOOD PRESSURE: 140 MMHG | BODY MASS INDEX: 28.65 KG/M2

## 2018-09-24 DIAGNOSIS — Z23 NEED FOR VACCINATION: ICD-10-CM

## 2018-09-24 DIAGNOSIS — I10 ESSENTIAL HYPERTENSION: ICD-10-CM

## 2018-09-24 DIAGNOSIS — F41.1 GAD (GENERALIZED ANXIETY DISORDER): ICD-10-CM

## 2018-09-24 PROCEDURE — 90471 IMMUNIZATION ADMIN: CPT | Performed by: FAMILY MEDICINE

## 2018-09-24 PROCEDURE — 99214 OFFICE O/P EST MOD 30 MIN: CPT | Mod: 25 | Performed by: FAMILY MEDICINE

## 2018-09-24 PROCEDURE — 90686 IIV4 VACC NO PRSV 0.5 ML IM: CPT | Performed by: FAMILY MEDICINE

## 2018-09-24 RX ORDER — FLUOXETINE HYDROCHLORIDE 20 MG/1
20 CAPSULE ORAL DAILY
Qty: 90 CAP | Refills: 3 | Status: SHIPPED | OUTPATIENT
Start: 2018-09-24 | End: 2019-04-25

## 2018-09-24 NOTE — PROGRESS NOTES
CC:Diagnoses of Need for vaccination, WILBERT (generalized anxiety disorder), and Essential hypertension were pertinent to this visit.    HISTORY OF PRESENT ILLNESS: Patient is a 62 y.o. female established patient who presents today to discuss her new complaint of generalized anxiety disorder.  As I walk into the room the patient starts to cry as she is telling me her story.  She is very distraught about many family interactions that have been going negatively and are causing her stress.  She would like to try medication but she is very hesitant about this.  We had to have a long discussion.    Health Maintenance: Completed    WILBERT (generalized anxiety disorder)  This is a chronic problem that started approximately 1 year ago and has gotten much worse.  Patient is the rock for her family and everyone is going through some very difficult times and turning to her to handle their problems and be their counselor.  She finds this is making her overly anxious and because of the anxiety she is having muscle spasms and she feels as if every bone in her body is painful.  She has been utilizing chamomile tea and prior but that is no longer working.  She also needs to utilize honey but that does not seem to help either.  We talked about the fact that there are medications to help with this.  I think we need to get her on medication and then see her back in about 4 weeks to make certain we are seeing improvement.  She is not sleeping well because she doesn't sleep soundly and has easy awakening.    Essential hypertension  This is a chronic health problem for this patient that is actually well controlled.  Patient's blood pressure is up today but she was very anxious about getting her had a difficult time finding a parking place etc.  I recommended to her that we get her on medication for the anxiety which is going to help all of this..      Patient Active Problem List    Diagnosis Date Noted   • WILBERT (generalized anxiety disorder)  09/24/2018   • GERD (gastroesophageal reflux disease) 08/17/2018   • Decreased hearing of right ear 04/24/2018   • Chronic pain of left knee 04/24/2018   • Hypercholesterolemia 02/12/2018   • Essential hypertension 01/10/2018   • History of ulcerative colitis 01/10/2018   • Irritable bowel syndrome with diarrhea 01/10/2018      Allergies:Patient has no known allergies.    Current Outpatient Prescriptions   Medication Sig Dispense Refill   • Zoster Vac Recomb Adjuvanted (SHINGRIX) 50 MCG Recon Susp 0.5 mL by Intramuscular route Once for 1 dose. 1 Each 1   • FLUoxetine (PROZAC) 20 MG Cap Take 1 Cap by mouth every day. 90 Cap 3   • omeprazole (PRILOSEC) 40 MG delayed-release capsule Take 1 Cap by mouth every day. 1/2 hour prior to same meal. 90 Cap 3   • raNITidine (ZANTAC) 150 MG Tab Take 1 Tab by mouth every bedtime. 1-2 hours prior to bed. 30 Tab 5   • atorvastatin (LIPITOR) 20 MG Tab Take 1 Tab by mouth every day. 90 Tab 3   • aspirin EC (ECOTRIN) 81 MG Tablet Delayed Response Take 1 Tab by mouth every day.     • chlorthalidone (HYGROTON) 25 MG Tab Take 1 Tab by mouth every day. 90 Tab 3     No current facility-administered medications for this visit.        Social History   Substance Use Topics   • Smoking status: Never Smoker   • Smokeless tobacco: Never Used   • Alcohol use No     Social History     Social History Narrative   • No narrative on file       Family History   Problem Relation Age of Onset   • Cancer Mother         lymphoma   • Psychiatry Father         depression, PTSD   • Heart Disease Father    • Stroke Father    • Breast Cancer Maternal Aunt 70        ROS:     - Constitutional:  Negative for fever, chills, unexpected weight change, and fatigue/generalized weakness.    - HEENT:  Negative for headaches, vision changes, hearing changes, ear pain, ear discharge, rhinorrhea, sinus congestion, sore throat, and neck pain.      - Respiratory: Negative for cough, sputum production, chest congestion,  "dyspnea, wheezing, and crackles.      - Cardiovascular: Negative for chest pain, palpitations, orthopnea, and bilateral lower extremity edema.     - Gastrointestinal: Negative for heartburn, nausea, vomiting, abdominal pain, hematochezia, melena, diarrhea, constipation, and greasy/foul-smelling stools.     - Genitourinary: Negative for dysuria, polyuria, hematuria, pyuria, urinary urgency, and urinary incontinence.     - Musculoskeletal: Negative for myalgias, back pain, and joint pain.     - Skin: Negative for rash, itching, cyanotic skin color change.     - Neurological: Negative for dizziness, tingling, tremors, focal sensory deficit, focal weakness and headaches.     - Endo/Heme/Allergies: Does not bruise/bleed easily.     - Psychiatric/Behavioral: Positive for increased anxiety but denies depression, suicidal/homicidal ideation and memory loss.          - NOTE: All other systems reviewed and are negative, except as in HPI.      Exam:    Blood pressure 140/86, pulse 68, temperature 36.7 °C (98 °F), resp. rate 12, height 1.626 m (5' 4\"), weight 76.1 kg (167 lb 12.8 oz), SpO2 98 %, not currently breastfeeding. Body mass index is 28.8 kg/m².    General:  Well nourished, well developed female in NAD  Head is grossly normal.  Neck: Supple without JVD or bruit. Thyroid is not enlarged.  Pulmonary: Clear to ausculation and percussion.  Normal effort. No rales, ronchi, or wheezing.  Cardiovascular: Regular rate and rhythm without murmur. Carotid and radial pulses are intact and equal bilaterally.  Extremities: no clubbing, cyanosis, or edema.  Patient was seen for 25 minutes face to face of which, 20 minutes was spent counseling regarding generalized anxiety, etiology and pathology as well as appropriate treatment choices..    Please note that this dictation was created using voice recognition software. I have made every reasonable attempt to correct obvious errors, but I expect that there are errors of grammar and " possibly content that I did not discover before finalizing the note.    Assessment/Plan:  1. Need for vaccination  Given today as well as an Rx for Shingrix to be purchased at [facility]  - Flu Quad Inj >3 Year Pre-Filled PF    2. WILBERT (generalized anxiety disorder)  Uncontrolled, patient willing to try fluoxetine 20 mg every morning.  We will see her back in 5 weeks.  I have made it clear to her that this will not get suddenly better with her first tablet.  I am hopeful that we will see improvement in the next 3-5 weeks.    3. Essential hypertension  Adequately controlled with current medications

## 2018-09-24 NOTE — ASSESSMENT & PLAN NOTE
This is a chronic problem that started approximately 1 year ago and has gotten much worse.  Patient is the rock for her family and everyone is going through some very difficult times and turning to her to handle their problems and be their counselor.  She finds this is making her overly anxious and because of the anxiety she is having muscle spasms and she feels as if every bone in her body is painful.  She has been utilizing chamomile tea and prior but that is no longer working.  She also needs to utilize honey but that does not seem to help either.  We talked about the fact that there are medications to help with this.  I think we need to get her on medication and then see her back in about 4 weeks to make certain we are seeing improvement.  She is not sleeping well because she doesn't sleep soundly and has easy awakening.

## 2018-09-24 NOTE — ASSESSMENT & PLAN NOTE
This is a chronic health problem for this patient that is actually well controlled.  Patient's blood pressure is up today but she was very anxious about getting her had a difficult time finding a parking place etc.  I recommended to her that we get her on medication for the anxiety which is going to help all of this..

## 2018-12-14 ENCOUNTER — OFFICE VISIT (OUTPATIENT)
Dept: MEDICAL GROUP | Facility: LAB | Age: 63
End: 2018-12-14
Payer: COMMERCIAL

## 2018-12-14 VITALS
BODY MASS INDEX: 28.17 KG/M2 | HEART RATE: 79 BPM | SYSTOLIC BLOOD PRESSURE: 112 MMHG | TEMPERATURE: 97.4 F | DIASTOLIC BLOOD PRESSURE: 62 MMHG | WEIGHT: 165 LBS | OXYGEN SATURATION: 98 % | RESPIRATION RATE: 12 BRPM | HEIGHT: 64 IN

## 2018-12-14 DIAGNOSIS — H65.91 RIGHT NON-SUPPURATIVE OTITIS MEDIA: Primary | ICD-10-CM

## 2018-12-14 PROBLEM — H92.03 OTALGIA OF BOTH EARS: Status: ACTIVE | Noted: 2018-12-14

## 2018-12-14 PROCEDURE — 99204 OFFICE O/P NEW MOD 45 MIN: CPT | Performed by: INTERNAL MEDICINE

## 2018-12-14 RX ORDER — AZITHROMYCIN 250 MG/1
250 TABLET, FILM COATED ORAL DAILY
Qty: 6 TAB | Refills: 0 | Status: SHIPPED | OUTPATIENT
Start: 2018-12-14 | End: 2018-12-19

## 2018-12-14 NOTE — PROGRESS NOTES
Chief Complaint   Patient presents with   • Ear Fullness     right/week       Subjective:     History of Present Illness: Patient is a 63 y.o. female. This pleasant patient of Dr. sEcobar who is here today for an acute visit.    tRight non-suppurative otitis media  New to discuss, uncontrolled problem.  She stated that her symptoms started last use day with the right ear pain and sore throat.  She described it as a deep your pain that is very sharp, constant, 5/10 in intensity, radiating to her right temporal area.  She has not tried any Tylenol or over-the-counter pain medication so far.  She does have chronic hearing loss that she cannot tell if this has changed recently.  She denies any ear discharge.  She does have sore throat and loss of her voice, she denies any sinus pain or congestion.  She has a dry cough that started yesterday but has no sputum production.  She denies fever or chills.  She denies sick contacts or smoking.        Allergies: Patient has no known allergies.    Current Outpatient Prescriptions Ordered in [facility]   Medication Sig Dispense Refill   • azithromycin (ZITHROMAX Z-MARIELOS) 250 MG Tab Take 1 Tab by mouth every day for 5 days. Take 2 tabs on day 1 6 Tab 0   • FLUoxetine (PROZAC) 20 MG Cap Take 1 Cap by mouth every day. 90 Cap 3   • omeprazole (PRILOSEC) 40 MG delayed-release capsule Take 1 Cap by mouth every day. 1/2 hour prior to same meal. 90 Cap 3   • raNITidine (ZANTAC) 150 MG Tab Take 1 Tab by mouth every bedtime. 1-2 hours prior to bed. 30 Tab 5   • atorvastatin (LIPITOR) 20 MG Tab Take 1 Tab by mouth every day. 90 Tab 3   • aspirin EC (ECOTRIN) 81 MG Tablet Delayed Response Take 1 Tab by mouth every day.     • chlorthalidone (HYGROTON) 25 MG Tab Take 1 Tab by mouth every day. 90 Tab 3     No current Epic-ordered facility-administered medications on file.        Past Medical History:   Diagnosis Date   • Essential hypertension 1/10/2018   • Fatigue 1/10/2018   • WILBERT (generalized  "anxiety disorder) 9/24/2018   • History of ulcerative colitis 1/10/2018   • Hypercholesterolemia 2/12/2018   • Irritable bowel syndrome with diarrhea 1/10/2018       History reviewed. No pertinent surgical history.    Social History   Substance Use Topics   • Smoking status: Never Smoker   • Smokeless tobacco: Never Used   • Alcohol use No       Family History   Problem Relation Age of Onset   • Cancer Mother         lymphoma   • Psychiatry Father         depression, PTSD   • Heart Disease Father    • Stroke Father    • Breast Cancer Maternal Aunt 70       ROS:     - Constitutional: Negative for fever, chills, unexpected weight change, and fatigue/generalized weakness.     - HEENT: Per HPI.    - Respiratory: Negative for cough, sputum production, dyspnea and wheezing.    - Cardiovascular: Negative for chest pain, palpitations, orthopnea, and bilateral lower extremity edema.     - Gastrointestinal: + Loose stools.  Negative for heartburn, nausea, vomiting, abdominal pain, hematochezia, melena, diarrhea, constipation, and greasy/foul-smelling stools.     - Genitourinary: Negative for dysuria, polyuria, hematuria, pyuria, urinary urgency, and urinary incontinence.    - Musculoskeletal: Negative for myalgias, back pain, and joint pain.     - Skin: Negative for rash, itching, cyanotic skin color change.     - Neurological: Negative for dizziness, tingling, tremors, focal sensory deficit, focal weakness and headaches.     - Endo/Heme/Allergies: Does not bruise/bleed easily.     - Psychiatric/Behavioral: Negative for depression, suicidal/homicidal ideation and memory loss.        - NOTE: All other systems reviewed and are negative, except as in HPI.       Physical Exam:     Blood pressure 112/62, pulse 79, temperature 36.3 °C (97.4 °F), resp. rate 12, height 1.626 m (5' 4.02\"), weight 74.8 kg (165 lb), SpO2 98 %, not currently breastfeeding. Body mass index is 28.31 kg/m².   General: Normal appearing. No distress.  HEENT: " Normocephalic. Eyes conjunctiva clear lids without ptosis, pupils equal and reactive to light accommodation, ears normal shape and contour, canals with minimal wax bilaterally. Rt canal and TM are erythematous and mildly bulging, no rupture. Lt ear canal and TM normal. Oropharynx is slightly erythematous without edema or exudates.    Neck: Supple without JVD or bruit. Thyroid is not enlarged.  Pulmonary: Clear to ausculation.  Normal effort. No rales, ronchi, or wheezing.  Cardiovascular: Regular rate and rhythm without murmur. Radial pulses are intact, regular and symmetrical bilaterally.  Abdomen: Soft, nontender, nondistended. Normal bowel sounds. Liver and spleen are not palpable.  Neurologic: Grossly non-focal.  Lymph: No cervical, occipital or supraclavicular lymph nodes are palpable  Skin: Warm and dry.  No obvious lesions.  Musculoskeletal: Normal gait. No extremity cyanosis, clubbing, or edema.  Psych: Normal mood and affect. Alert and oriented x3. Judgment and insight is normal.    Data:     LABS: Normal CMP 8/2018: Results reviewed and discussed with the patient, questions answered.      Assessment and Plan:     1. Right non-suppurative otitis media  New, uncontrolled problem.   Clinical evaluation is consistent with mild pharyngitis and acute rt sided otitis media.   Will proceed with a Z pack as below.  Recommended supportive care with Tylenol for pain/ fever. Saline throat rinse. Probiotics use OTC. Discussed potential side effects from Azithromycin.      - azithromycin (ZITHROMAX Z-MARIELOS) 250 MG Tab; Take 1 Tab by mouth every day for 5 days. Take 2 tabs on day 1  Dispense: 6 Tab; Refill: 0        Follow Up:      Return for PRN with PCP.    Please note that this dictation was created using voice recognition software. I have made every reasonable attempt to correct obvious errors, but I expect that there are errors of grammar and possibly content that I did not discover before finalizing the  note.    Signed by: Marion Lucero M.D.

## 2018-12-14 NOTE — ASSESSMENT & PLAN NOTE
New to discuss, uncontrolled problem.  She stated that her symptoms started last use day with the right ear pain and sore throat.  She described it as a deep your pain that is very sharp, constant, 5/10 in intensity, radiating to her right temporal area.  She has not tried any Tylenol or over-the-counter pain medication so far.  She does have chronic hearing loss that she cannot tell if this has changed recently.  She denies any ear discharge.  She does have sore throat and loss of her voice, she denies any sinus pain or congestion.  She has a dry cough that started yesterday but has no sputum production.  She denies fever or chills.  She denies sick contacts or smoking.

## 2019-02-13 ENCOUNTER — OFFICE VISIT (OUTPATIENT)
Dept: DERMATOLOGY | Facility: IMAGING CENTER | Age: 64
End: 2019-02-13
Payer: COMMERCIAL

## 2019-02-13 VITALS — HEIGHT: 64 IN | WEIGHT: 160 LBS | BODY MASS INDEX: 27.31 KG/M2 | TEMPERATURE: 98.1 F

## 2019-02-13 DIAGNOSIS — L82.0 SEBORRHEIC KERATOSES, INFLAMED: ICD-10-CM

## 2019-02-13 DIAGNOSIS — L29.9 PRURITUS: ICD-10-CM

## 2019-02-13 DIAGNOSIS — L81.4 LENTIGINES: ICD-10-CM

## 2019-02-13 DIAGNOSIS — L85.3 XEROSIS CUTIS: ICD-10-CM

## 2019-02-13 PROCEDURE — 17110 DESTRUCTION B9 LES UP TO 14: CPT | Performed by: DERMATOLOGY

## 2019-02-13 PROCEDURE — 99203 OFFICE O/P NEW LOW 30 MIN: CPT | Mod: 25 | Performed by: DERMATOLOGY

## 2019-02-13 ASSESSMENT — ENCOUNTER SYMPTOMS
CHILLS: 0
FEVER: 0

## 2019-02-13 NOTE — PROGRESS NOTES
Dermatology New Patient Visit    Chief Complaint   Patient presents with   • Skin Lesion       Subjective:     HPI:   Elin Ambriz is a 63 y.o. female presenting for    HPI: several SKs on back   Time present: 10 years   Painful lesion: No  Itching lesion: Yes - very bothersome  Enlarging lesion: Yes, several  Anything make it better or worse? Can bleed when she scratches - sometimes keeps her up at night    Very dry skin  Worsened over the past few years  Not moisturizing   Showers with hot water, dove soap  Notes hands sometimes have open sores    Brown spots on the face  Darkening over time, increasing in number  No itching/bleeding/pain  No treatments    History of skin cancer: No  History of biopsies:No  History of blistering/severe sunburns:Yes, Details: a few when younger  Family history of skin cancer:No  Family history of atypical moles:No        Past Medical History:   Diagnosis Date   • Essential hypertension 1/10/2018   • Fatigue 1/10/2018   • WILBERT (generalized anxiety disorder) 9/24/2018   • History of ulcerative colitis 1/10/2018   • Hypercholesterolemia 2/12/2018   • Irritable bowel syndrome with diarrhea 1/10/2018       Current Outpatient Prescriptions on File Prior to Visit   Medication Sig Dispense Refill   • FLUoxetine (PROZAC) 20 MG Cap Take 1 Cap by mouth every day. 90 Cap 3   • omeprazole (PRILOSEC) 40 MG delayed-release capsule Take 1 Cap by mouth every day. 1/2 hour prior to same meal. 90 Cap 3   • raNITidine (ZANTAC) 150 MG Tab Take 1 Tab by mouth every bedtime. 1-2 hours prior to bed. 30 Tab 5   • atorvastatin (LIPITOR) 20 MG Tab Take 1 Tab by mouth every day. 90 Tab 3   • aspirin EC (ECOTRIN) 81 MG Tablet Delayed Response Take 1 Tab by mouth every day.     • chlorthalidone (HYGROTON) 25 MG Tab Take 1 Tab by mouth every day. 90 Tab 3     No current facility-administered medications on file prior to visit.        No Known Allergies    Family History   Problem Relation Age of Onset   •  "Cancer Mother         lymphoma   • Psychiatry Father         depression, PTSD   • Heart Disease Father    • Stroke Father    • Breast Cancer Maternal Aunt 70       Social History     Social History   • Marital status:      Spouse name: N/A   • Number of children: N/A   • Years of education: N/A     Occupational History   • Not on file.     Social History Main Topics   • Smoking status: Never Smoker   • Smokeless tobacco: Never Used   • Alcohol use No   • Drug use: No   • Sexual activity: Yes     Partners: Male      Comment: , [employer]      Other Topics Concern   • Not on file     Social History Narrative   • No narrative on file       Review of Systems   Constitutional: Negative for chills and fever.   Skin: Positive for itching. Negative for rash.   All other systems reviewed and are negative.       Objective:     A focused cutaneous exam was completed including: hair, ears, face, eyelids, conjunctiva, lips, neck, back, left and right upper extremities (including hands/digits and fingernails) with the following pertinent findings listed below. Remaining above-listed examined areas within normal limits / negative for rashes or lesions.    Temperature 36.7 °C (98.1 °F), height 1.626 m (5' 4\"), weight 72.6 kg (160 lb), not currently breastfeeding.    Physical Exam   Constitutional: She is oriented to person, place, and time and well-developed, well-nourished, and in no distress.   HENT:   Head: Normocephalic and atraumatic.       Eyes: Conjunctivae and lids are normal.   Neck: Normal range of motion.   Pulmonary/Chest: Effort normal.   Neurological: She is alert and oriented to person, place, and time.   Skin: Skin is warm and dry.        Psychiatric: Mood and affect normal.   Vitals reviewed.      DATA: none applicable to review    Assessment and Plan:     1. Seborrheic keratoses, inflamed, +Pruritus  CRYOTHERAPY:  Risks (including, but not limited to: hypo or hyperpigmentation, redness, blister, " blood blister, recurrence, need for further treatment, infection, scar) and benefits of cryotherapy discussed. Patient verbally agreed to proceed with treatment. 3 cryotherapy freeze thaw cycles of 10 seconds were applied to 6 lesions on the back with cryac. Patient tolerated procedure well. Aftercare instructions given.    2. Xerosis cutis  - extensively discussed importance of regular moisturization to the affected area of skin during flares, and also for maintenance therapy liberally, several times a day, to protect the skin barrier. OTC moisturizers recommended    3. Lentigines  - Benign-appearing nature of lesions discussed. Advised to return to clinic for any new or concerning changes.  - discussed importance of regular sun protection/sunscreen use, SPF 30 or greater with broad spectrum coverage, need for reapplication every  minutes  - cosmetic treatment options discussed    Followup: Return if symptoms worsen or fail to improve.    April Claros M.D.

## 2019-04-24 ENCOUNTER — APPOINTMENT (OUTPATIENT)
Dept: DERMATOLOGY | Facility: IMAGING CENTER | Age: 64
End: 2019-04-24

## 2019-04-25 ENCOUNTER — OFFICE VISIT (OUTPATIENT)
Dept: MEDICAL GROUP | Facility: MEDICAL CENTER | Age: 64
End: 2019-04-25
Payer: COMMERCIAL

## 2019-04-25 VITALS
HEIGHT: 64 IN | TEMPERATURE: 97.7 F | HEART RATE: 78 BPM | OXYGEN SATURATION: 95 % | DIASTOLIC BLOOD PRESSURE: 72 MMHG | SYSTOLIC BLOOD PRESSURE: 120 MMHG | BODY MASS INDEX: 28.15 KG/M2 | WEIGHT: 164.9 LBS

## 2019-04-25 DIAGNOSIS — R05.9 COUGH: ICD-10-CM

## 2019-04-25 DIAGNOSIS — K21.00 GASTROESOPHAGEAL REFLUX DISEASE WITH ESOPHAGITIS: ICD-10-CM

## 2019-04-25 DIAGNOSIS — I10 ESSENTIAL HYPERTENSION: ICD-10-CM

## 2019-04-25 DIAGNOSIS — F41.1 GAD (GENERALIZED ANXIETY DISORDER): ICD-10-CM

## 2019-04-25 DIAGNOSIS — E78.00 HYPERCHOLESTEROLEMIA: ICD-10-CM

## 2019-04-25 DIAGNOSIS — J30.2 SEASONAL ALLERGIES: ICD-10-CM

## 2019-04-25 PROCEDURE — 99214 OFFICE O/P EST MOD 30 MIN: CPT | Performed by: FAMILY MEDICINE

## 2019-04-25 RX ORDER — METHYLPREDNISOLONE 4 MG/1
TABLET ORAL
Qty: 21 TAB | Refills: 0 | Status: SHIPPED | OUTPATIENT
Start: 2019-04-25 | End: 2019-08-15

## 2019-04-25 ASSESSMENT — PATIENT HEALTH QUESTIONNAIRE - PHQ9: CLINICAL INTERPRETATION OF PHQ2 SCORE: 0

## 2019-04-25 NOTE — ASSESSMENT & PLAN NOTE
This was a chronic problem for this patient back in September when she was placed on fluoxetine 20 mg daily.  She actually started feeling better and has continued to feel well so she went off of the fluoxetine and does not feel that her anxiety has returned.  She feels that she is doing very well without medication.  We will resolve the issue of generalized anxiety disorder.

## 2019-04-25 NOTE — ASSESSMENT & PLAN NOTE
This patient has severe reflux to the point that she actually regurgitates acid regularly and has now developed a cough and laryngitis from the acid affecting her larynx.  Patient is currently taking omeprazole 40 mg once a day and ranitidine 150 mg once a day but has not been able to see any improvement in this cough which has been worsening over the last 2 years.  I recommended to the patient that we get her back into see Dr. Escoto and we will put in an urgent referral.

## 2019-04-25 NOTE — ASSESSMENT & PLAN NOTE
This is a chronic problem that this patient has had for about 2 years and finds it is getting much worse.  Patient states that she has such severe reflux that she ends up with a cough and then ends up with laryngitis from the chemical, acid contents getting onto the larynx itself.  Patient has noted that recently this is gotten much worse and she is had no relief.  We are going to get her back into gastroenterology but she is also wondering if there is anything we could do to try and help with her cough.  She has not had adequate rest because she has a cough.

## 2019-04-25 NOTE — ASSESSMENT & PLAN NOTE
This is a chronic problem for this patient that in the past she is been on chlorthalidone 25 mg daily and her blood pressure was doing well.  She went off medication because she was feeling so well and did not believe she needed it.  Her blood pressure today is okay at 120/72.  We will go ahead and check some blood work for her and then see her back to determine whether or not she needs to be on medication any longer

## 2019-04-25 NOTE — ASSESSMENT & PLAN NOTE
This patient has a history of seasonal allergies and is also affected by perfumes or certain scents.  She does not know if this severe cough that she is currently experiencing is secondary to that.  We could try her on a Medrol Dosepak to see if we could calm down her allergies and make this better.  We will try that but in the meantime really want to get her back to being seen by GI.

## 2019-04-25 NOTE — ASSESSMENT & PLAN NOTE
This is a chronic health problem for this patient for which she was on atorvastatin.  Last year in the fall she started feeling better and thought she was doing so well she did not need the medication.  She took herself off of it.  We have not had blood work done since that time.  We will check baseline blood work and get her back to determine whether or not she does need cholesterol-lowering medications.

## 2019-04-25 NOTE — PROGRESS NOTES
CC:Diagnoses of Gastroesophageal reflux disease with esophagitis, Cough, Essential hypertension, Hypercholesterolemia, WILBERT (generalized anxiety disorder), and Seasonal allergies were pertinent to this visit.    HISTORY OF PRESENT ILLNESS: Patient is a 63 y.o. female established patient who presents today to talk about her chronic health problems as outlined below.  Patient informs me that last year before the end of the year she actually went off of her antihypertensive and her lipid-lowering medication because she thought she was feeling well and did not need it.  She also stopped her antidepressant at that time.  She states that since that time she is done fine she does not know whether or not she has high blood pressure high cholesterol because she does not check.  She is here today mainly to try and get help with her cough and her reflux.  She is willing to do blood work to determine whether or not she truly needs to be on medication.    Health Maintenance: Completed    GERD (gastroesophageal reflux disease)  This patient has severe reflux to the point that she actually regurgitates acid regularly and has now developed a cough and laryngitis from the acid affecting her larynx.  Patient is currently taking omeprazole 40 mg once a day and ranitidine 150 mg once a day but has not been able to see any improvement in this cough which has been worsening over the last 2 years.  I recommended to the patient that we get her back into see Dr. Escoto and we will put in an urgent referral.    Cough  This is a chronic problem that this patient has had for about 2 years and finds it is getting much worse.  Patient states that she has such severe reflux that she ends up with a cough and then ends up with laryngitis from the chemical, acid contents getting onto the larynx itself.  Patient has noted that recently this is gotten much worse and she is had no relief.  We are going to get her back into gastroenterology but she is  also wondering if there is anything we could do to try and help with her cough.  She has not had adequate rest because she has a cough.    Essential hypertension  This is a chronic problem for this patient that in the past she is been on chlorthalidone 25 mg daily and her blood pressure was doing well.  She went off medication because she was feeling so well and did not believe she needed it.  Her blood pressure today is okay at 120/72.  We will go ahead and check some blood work for her and then see her back to determine whether or not she needs to be on medication any longer    Hypercholesterolemia  This is a chronic health problem for this patient for which she was on atorvastatin.  Last year in the fall she started feeling better and thought she was doing so well she did not need the medication.  She took herself off of it.  We have not had blood work done since that time.  We will check baseline blood work and get her back to determine whether or not she does need cholesterol-lowering medications.    WILBERT (generalized anxiety disorder)  This was a chronic problem for this patient back in September when she was placed on fluoxetine 20 mg daily.  She actually started feeling better and has continued to feel well so she went off of the fluoxetine and does not feel that her anxiety has returned.  She feels that she is doing very well without medication.  We will resolve the issue of generalized anxiety disorder.    Seasonal allergies  This patient has a history of seasonal allergies and is also affected by perfumes or certain scents.  She does not know if this severe cough that she is currently experiencing is secondary to that.  We could try her on a Medrol Dosepak to see if we could calm down her allergies and make this better.  We will try that but in the meantime really want to get her back to being seen by GI.      Patient Active Problem List    Diagnosis Date Noted   • Cough 04/25/2019   • Seasonal allergies  04/25/2019   • Right non-suppurative otitis media 12/14/2018   • GERD (gastroesophageal reflux disease) 08/17/2018   • Decreased hearing of right ear 04/24/2018   • Chronic pain of left knee 04/24/2018   • Hypercholesterolemia 02/12/2018   • Essential hypertension 01/10/2018   • History of ulcerative colitis 01/10/2018   • Irritable bowel syndrome with diarrhea 01/10/2018      Allergies:Patient has no known allergies.    Current Outpatient Prescriptions   Medication Sig Dispense Refill   • MethylPREDNISolone (MEDROL DOSEPAK) 4 MG Tablet Therapy Pack As directed on the packaging label. 21 Tab 0   • omeprazole (PRILOSEC) 40 MG delayed-release capsule Take 1 Cap by mouth every day. 1/2 hour prior to same meal. 90 Cap 3   • raNITidine (ZANTAC) 150 MG Tab Take 1 Tab by mouth every bedtime. 1-2 hours prior to bed. 30 Tab 5   • aspirin EC (ECOTRIN) 81 MG Tablet Delayed Response Take 1 Tab by mouth every day.       No current facility-administered medications for this visit.        Social History   Substance Use Topics   • Smoking status: Never Smoker   • Smokeless tobacco: Never Used   • Alcohol use No     Social History     Social History Narrative   • No narrative on file       Family History   Problem Relation Age of Onset   • Cancer Mother         lymphoma   • Psychiatry Father         depression, PTSD   • Heart Disease Father    • Stroke Father    • Breast Cancer Maternal Aunt 70        ROS:     - Constitutional: Positive for fatigue secondary to poor sleep secondary to bad cough.     - HEENT: Positive for postnasal drip and drainage in the posterior pharynx causing cough to worsen over the last 2-3 months.       - Respiratory: Nonproductive cough x2 years.      - Cardiovascular: Negative for chest pain, palpitations, orthopnea, and bilateral lower extremity edema.     - Gastrointestinal: Positive for severe reflux otherwise denies nausea vomiting abdominal pain changes in bowel habits.      - Genitourinary: Negative  "for dysuria, polyuria, hematuria, pyuria, urinary urgency, and urinary incontinence.     - Musculoskeletal: Negative for myalgias, back pain, and joint pain.     - Skin: Negative for rash, itching, cyanotic skin color change.     - Neurological:Negative for dizziness, tingling, tremors, focal sensory deficit, focal weakness and headaches.     - Endo/Heme/Allergies: Does not bruise/bleed easily.     - Psychiatric/Behavioral: Negative for depression, suicidal/homicidal ideation and memory loss.          - NOTE: All other systems reviewed and are negative, except as in HPI.      Exam:    /72 (BP Location: Left arm, Patient Position: Sitting, BP Cuff Size: Adult)   Pulse 78   Temp 36.5 °C (97.7 °F) (Temporal)   Ht 1.626 m (5' 4\")   Wt 74.8 kg (164 lb 14.5 oz)   SpO2 95%  Body mass index is 28.31 kg/m².    General:  Well nourished, well developed female in NAD  HEENT: Eyes conjunctiva is clear, lids without ptosis, pupils equal round and reactive to light and accommodation.  Ears normal shape and contour, canals are clear bilaterally, TMs with good light reflex and appear normal.  Nasal mucosa pale and edematous with clear rhinorrhea.  Oropharynx benign.  Sinuses (frontal and maxillary) nontender to palpation.  Head is grossly normal.  Neck: Supple without JVD or bruit. Thyroid is not enlarged.  Pulmonary: Clear to ausculation and percussion.  Normal effort. No rales, ronchi, or wheezing.  Cardiovascular: Regular rate and rhythm without murmur. Carotid and radial pulses are intact and equal bilaterally.  Please note that this dictation was created using voice recognition software. I have made every reasonable attempt to correct obvious errors, but I expect that there are errors of grammar and possibly content that I did not discover before finalizing the note.    Assessment/Plan:  1. Gastroesophageal reflux disease with esophagitis  Controlled, in light of patient's complaint that she has uncontrolled reflux " and she is on maximum therapy I would like for her to go back to her gastroenterologist and she will call him for a consultation.  I put in an urgent referral.  - REFERRAL TO GASTROENTEROLOGY    2. Cough  Uncontrolled, there is every possibility this is a combination of her reflux and her seasonal allergies.  She is already on maximum therapy for her reflux and we will add a Medrol Dosepak to try and help with her seasonal allergies.  She will continue Claritin daily and will see if this gets this under control.    3. Essential hypertension  Currently stable, patient not checking blood pressure at home.  We will see her back in 8 weeks to be certain that her blood pressure staying well controlled.    4. Hypercholesterolemia  Unknown control, patient went off her medication and has not taken any new blood test.  We will get blood test and see her back in 8 weeks.  - Comp Metabolic Panel; Future  - Lipid Profile; Future    5. WILBERT (generalized anxiety disorder)  This problem appears to be resolved and has been taken off of her current active problem list.    6. Seasonal allergies  Uncontrolled, patient will continue with Claritin we will add a Medrol Dosepak to see if we can calm down coughing she currently is experiencing.

## 2019-04-29 ENCOUNTER — PATIENT MESSAGE (OUTPATIENT)
Dept: MEDICAL GROUP | Facility: MEDICAL CENTER | Age: 64
End: 2019-04-29

## 2019-04-29 RX ORDER — BENZONATATE 100 MG/1
100 CAPSULE ORAL 3 TIMES DAILY PRN
Qty: 60 CAP | Refills: 0 | Status: SHIPPED | OUTPATIENT
Start: 2019-04-29 | End: 2019-08-15

## 2019-04-29 NOTE — TELEPHONE ENCOUNTER
From: Elin Ambriz  To: Monae Daniels M.D.  Sent: 4/29/2019 1:02 PM PDT  Subject: Prescription Question    Dr. Daniels, Can you prescribe an additional medication for the cough I'm experiencing?     First availability for Doctor Keshav Escoto is July. Dr Escoto's team will see me this Friday, May 3rd. My cough is persistent, mostly unproductive with hunks of white mucus that I spit out. I'm coughing uncontrollable, with a deep sore throat and pain going into my ears. The cough is deep that seems to vibrate deep down my throat. The pain is my ears is a solid itch that doesn't go away for about 20 minutes. The cough has gotten worse since our last visit. During the night, I'm coughing for about an hour straight, finally I get sleep for about 2 hours. Currently, I have a runny nose with clear liquid.   [facility] is fine. Regards, Elin Ambriz

## 2019-06-20 ENCOUNTER — HOSPITAL ENCOUNTER (OUTPATIENT)
Dept: LAB | Facility: MEDICAL CENTER | Age: 64
End: 2019-06-20
Attending: PODIATRIST
Payer: COMMERCIAL

## 2019-06-20 LAB
ERYTHROCYTE [SEDIMENTATION RATE] IN BLOOD BY WESTERGREN METHOD: 5 MM/HOUR (ref 0–30)
RHEUMATOID FACT SER IA-ACNC: 10 IU/ML (ref 0–14)
URATE SERPL-MCNC: 4.1 MG/DL (ref 1.9–8.2)

## 2019-06-20 PROCEDURE — 86812 HLA TYPING A B OR C: CPT

## 2019-06-20 PROCEDURE — 84550 ASSAY OF BLOOD/URIC ACID: CPT

## 2019-06-20 PROCEDURE — 86431 RHEUMATOID FACTOR QUANT: CPT

## 2019-06-20 PROCEDURE — 36415 COLL VENOUS BLD VENIPUNCTURE: CPT

## 2019-06-20 PROCEDURE — 85652 RBC SED RATE AUTOMATED: CPT

## 2019-06-22 LAB — HLA-B27 QL FC: NEGATIVE

## 2019-07-29 ENCOUNTER — APPOINTMENT (OUTPATIENT)
Dept: MEDICAL GROUP | Facility: MEDICAL CENTER | Age: 64
End: 2019-07-29
Payer: COMMERCIAL

## 2019-08-09 ENCOUNTER — TELEPHONE (OUTPATIENT)
Dept: MEDICAL GROUP | Facility: MEDICAL CENTER | Age: 64
End: 2019-08-09

## 2019-08-09 NOTE — TELEPHONE ENCOUNTER
VOICEMAIL  1. Caller Name: Elin Ambriz                        Call Back Number: [phone] (home)      2. Message: Patient called stating she did not have her blood work orders. I have called her and told her the lab will have her orders. She stated she will go tomorrow.     3. Patient approves office to leave a detailed voicemail/MyChart message: N\A

## 2019-08-13 ENCOUNTER — HOSPITAL ENCOUNTER (OUTPATIENT)
Dept: LAB | Facility: MEDICAL CENTER | Age: 64
End: 2019-08-13
Attending: FAMILY MEDICINE
Payer: COMMERCIAL

## 2019-08-13 DIAGNOSIS — E78.00 HYPERCHOLESTEROLEMIA: ICD-10-CM

## 2019-08-13 LAB
ALBUMIN SERPL BCP-MCNC: 4.1 G/DL (ref 3.2–4.9)
ALBUMIN/GLOB SERPL: 1.3 G/DL
ALP SERPL-CCNC: 83 U/L (ref 30–99)
ALT SERPL-CCNC: 11 U/L (ref 2–50)
ANION GAP SERPL CALC-SCNC: 8 MMOL/L (ref 0–11.9)
AST SERPL-CCNC: 18 U/L (ref 12–45)
BILIRUB SERPL-MCNC: 1.1 MG/DL (ref 0.1–1.5)
BUN SERPL-MCNC: 11 MG/DL (ref 8–22)
CALCIUM SERPL-MCNC: 9.4 MG/DL (ref 8.5–10.5)
CHLORIDE SERPL-SCNC: 107 MMOL/L (ref 96–112)
CHOLEST SERPL-MCNC: 258 MG/DL (ref 100–199)
CO2 SERPL-SCNC: 26 MMOL/L (ref 20–33)
CREAT SERPL-MCNC: 0.68 MG/DL (ref 0.5–1.4)
FASTING STATUS PATIENT QL REPORTED: NORMAL
GLOBULIN SER CALC-MCNC: 3.1 G/DL (ref 1.9–3.5)
GLUCOSE SERPL-MCNC: 88 MG/DL (ref 65–99)
HDLC SERPL-MCNC: 65 MG/DL
LDLC SERPL CALC-MCNC: 169 MG/DL
POTASSIUM SERPL-SCNC: 4.3 MMOL/L (ref 3.6–5.5)
PROT SERPL-MCNC: 7.2 G/DL (ref 6–8.2)
SODIUM SERPL-SCNC: 141 MMOL/L (ref 135–145)
TRIGL SERPL-MCNC: 118 MG/DL (ref 0–149)

## 2019-08-13 PROCEDURE — 80053 COMPREHEN METABOLIC PANEL: CPT

## 2019-08-13 PROCEDURE — 36415 COLL VENOUS BLD VENIPUNCTURE: CPT

## 2019-08-13 PROCEDURE — 80061 LIPID PANEL: CPT

## 2019-08-15 ENCOUNTER — OFFICE VISIT (OUTPATIENT)
Dept: MEDICAL GROUP | Facility: MEDICAL CENTER | Age: 64
End: 2019-08-15
Payer: COMMERCIAL

## 2019-08-15 VITALS
WEIGHT: 167.11 LBS | BODY MASS INDEX: 28.53 KG/M2 | HEIGHT: 64 IN | RESPIRATION RATE: 12 BRPM | TEMPERATURE: 98.3 F | DIASTOLIC BLOOD PRESSURE: 66 MMHG | HEART RATE: 85 BPM | SYSTOLIC BLOOD PRESSURE: 108 MMHG | OXYGEN SATURATION: 96 %

## 2019-08-15 DIAGNOSIS — I10 ESSENTIAL HYPERTENSION: ICD-10-CM

## 2019-08-15 DIAGNOSIS — Z11.59 ENCOUNTER FOR HEPATITIS C SCREENING TEST FOR LOW RISK PATIENT: ICD-10-CM

## 2019-08-15 DIAGNOSIS — K21.00 GASTROESOPHAGEAL REFLUX DISEASE WITH ESOPHAGITIS: ICD-10-CM

## 2019-08-15 DIAGNOSIS — Z23 NEED FOR VACCINATION: ICD-10-CM

## 2019-08-15 DIAGNOSIS — Z12.39 BREAST CANCER SCREENING: ICD-10-CM

## 2019-08-15 DIAGNOSIS — E78.00 HYPERCHOLESTEROLEMIA: ICD-10-CM

## 2019-08-15 PROBLEM — H65.91 RIGHT NON-SUPPURATIVE OTITIS MEDIA: Status: RESOLVED | Noted: 2018-12-14 | Resolved: 2019-08-15

## 2019-08-15 PROBLEM — R05.9 COUGH: Status: RESOLVED | Noted: 2019-04-25 | Resolved: 2019-08-15

## 2019-08-15 PROCEDURE — 99214 OFFICE O/P EST MOD 30 MIN: CPT | Mod: 25 | Performed by: FAMILY MEDICINE

## 2019-08-15 PROCEDURE — 90471 IMMUNIZATION ADMIN: CPT | Performed by: FAMILY MEDICINE

## 2019-08-15 PROCEDURE — 90715 TDAP VACCINE 7 YRS/> IM: CPT | Performed by: FAMILY MEDICINE

## 2019-08-15 RX ORDER — ROSUVASTATIN CALCIUM 5 MG/1
5 TABLET, COATED ORAL EVERY EVENING
Qty: 90 TAB | Refills: 3 | Status: SHIPPED | OUTPATIENT
Start: 2019-08-15 | End: 2020-12-09 | Stop reason: SDUPTHER

## 2019-08-15 NOTE — ASSESSMENT & PLAN NOTE
This is a chronic health problem that is well controlled with current lifestyle measures.  Previously she was on chlorthalidone to bring down her blood pressure but she just made the decision that she is not going to let her body get ahead of her mind.  She really has worked at relaxation and off of chlorthalidone her blood pressure is actually better at 108/66.  We will leave her off of the medication.

## 2019-08-15 NOTE — PROGRESS NOTES
CC:Diagnoses of Need for vaccination, Breast cancer screening, Hypercholesterolemia, Essential hypertension, Gastroesophageal reflux disease with esophagitis, and Encounter for hepatitis C screening test for low risk patient were pertinent to this visit.    HISTORY OF PRESENT ILLNESS: Patient is a 63 y.o. female established patient who presents today to talk about her chronic health problems as outlined below.  She did her blood work and wants to review that today.    Health Maintenance: Completed    Hypercholesterolemia  This is a chronic health problem for this patient where she had taken herself off of her lipid-lowering medication in the past because she was feeling well and thought she was doing well.  We did baseline blood work and her total cholesterol is 258, triglycerides are good at 118 and her HDL is good at 65 but her LDL is up at 169.  We need her LDL to be less than 100.  We are going to start her on rosuvastatin 5 mg daily and plan to check her again in 3 months.    Essential hypertension  This is a chronic health problem that is well controlled with current lifestyle measures.  Previously she was on chlorthalidone to bring down her blood pressure but she just made the decision that she is not going to let her body get ahead of her mind.  She really has worked at relaxation and off of chlorthalidone her blood pressure is actually better at 108/66.  We will leave her off of the medication.    GERD (gastroesophageal reflux disease)  This is a chronic health problem for this patient where she did have an EGD which shows that she does have reflux and she has to continue on omeprazole.  When she was not on the omeprazole she developed a cough.  She is taking her omeprazole daily and doing very well.      Patient Active Problem List    Diagnosis Date Noted   • Seasonal allergies 04/25/2019   • GERD (gastroesophageal reflux disease) 08/17/2018   • Decreased hearing of right ear 04/24/2018   • Chronic pain of  left knee 04/24/2018   • Hypercholesterolemia 02/12/2018   • Essential hypertension 01/10/2018   • History of ulcerative colitis 01/10/2018   • Irritable bowel syndrome with diarrhea 01/10/2018      Allergies:Patient has no known allergies.    Current Outpatient Medications   Medication Sig Dispense Refill   • rosuvastatin (CRESTOR) 5 MG Tab Take 1 Tab by mouth every evening. 90 Tab 3   • omeprazole (PRILOSEC) 40 MG delayed-release capsule Take 1 Cap by mouth every day. 1/2 hour prior to same meal. 90 Cap 3   • raNITidine (ZANTAC) 150 MG Tab Take 1 Tab by mouth every bedtime. 1-2 hours prior to bed. 30 Tab 5   • aspirin EC (ECOTRIN) 81 MG Tablet Delayed Response Take 1 Tab by mouth every day.       No current facility-administered medications for this visit.        Social History     Tobacco Use   • Smoking status: Never Smoker   • Smokeless tobacco: Never Used   Substance Use Topics   • Alcohol use: No   • Drug use: No     Social History     Social History Narrative   • Not on file       Family History   Problem Relation Age of Onset   • Cancer Mother         lymphoma   • Psychiatric Illness Father         depression, PTSD   • Heart Disease Father    • Stroke Father    • Breast Cancer Maternal Aunt 70        ROS:     - Constitutional:  Negative for fever, chills, unexpected weight change, and fatigue/generalized weakness.    - HEENT:  Negative for headaches, vision changes, hearing changes, ear pain, ear discharge, rhinorrhea, sinus congestion, sore throat, and neck pain.      - Respiratory: Negative for cough, sputum production, chest congestion, dyspnea, wheezing, and crackles.      - Cardiovascular: Negative for chest pain, palpitations, orthopnea, and bilateral lower extremity edema.     - Gastrointestinal: Negative for heartburn, nausea, vomiting, abdominal pain, hematochezia, melena, diarrhea, constipation, and greasy/foul-smelling stools.     - Genitourinary: Negative for dysuria, polyuria, hematuria,  "pyuria, urinary urgency, and urinary incontinence.     - Musculoskeletal: Negative for myalgias, back pain, and joint pain.     - Skin: Negative for rash, itching, cyanotic skin color change.     - Neurological: Negative for dizziness, tingling, tremors, focal sensory deficit, focal weakness and headaches.     - Endo/Heme/Allergies: Does not bruise/bleed easily.     - Psychiatric/Behavioral: Negative for depression, suicidal/homicidal ideation and memory loss.          - NOTE: All other systems reviewed and are negative, except as in HPI.      Exam:    /66 (BP Location: Left arm, Patient Position: Sitting, BP Cuff Size: Adult)   Pulse 85   Temp 36.8 °C (98.3 °F) (Temporal)   Resp 12   Ht 1.626 m (5' 4\")   Wt 75.8 kg (167 lb 1.7 oz)   SpO2 96%  Body mass index is 28.68 kg/m².    General:  Well nourished, well developed female in NAD  Head is grossly normal.  Neck: Supple without JVD or bruit. Thyroid is not enlarged.  Pulmonary: Clear to ausculation and percussion.  Normal effort. No rales, ronchi, or wheezing.  Cardiovascular: Regular rate and rhythm without murmur. Carotid and radial pulses are intact and equal bilaterally.  Extremities: no clubbing, cyanosis, or edema.  LABS: 8/13/2019: Results reviewed and discussed with the patient, questions answered.    Please note that this dictation was created using voice recognition software. I have made every reasonable attempt to correct obvious errors, but I expect that there are errors of grammar and possibly content that I did not discover before finalizing the note.    Assessment/Plan:  1. Need for vaccination  Given today  - Tdap Vaccine =>8YO IM    2. Breast cancer screening  She will schedule her own mammogram  - MA-SCREENING MAMMO BILAT W/TOMOSYNTHESIS W/CAD; Future    3. Hypercholesterolemia  Uncontrolled, we will start her on rosuvastatin 5 mg daily and then recheck in 3 months  - Comp Metabolic Panel; Future  - Lipid Profile; Future    4. Essential " hypertension  Patient has been able to control her blood pressure without medication now for the last 6 months.  We will continue to monitor.  She is done a great job.    5. Gastroesophageal reflux disease with esophagitis  Well-controlled with current medications.    6. Encounter for hepatitis C screening test for low risk patient  Time will be to check her hepatitis C status.  - HEP C VIRUS ANTIBODY; Future

## 2019-08-15 NOTE — ASSESSMENT & PLAN NOTE
This is a chronic health problem for this patient where she did have an EGD which shows that she does have reflux and she has to continue on omeprazole.  When she was not on the omeprazole she developed a cough.  She is taking her omeprazole daily and doing very well.

## 2019-08-15 NOTE — ASSESSMENT & PLAN NOTE
This is a chronic health problem for this patient where she had taken herself off of her lipid-lowering medication in the past because she was feeling well and thought she was doing well.  We did baseline blood work and her total cholesterol is 258, triglycerides are good at 118 and her HDL is good at 65 but her LDL is up at 169.  We need her LDL to be less than 100.  We are going to start her on rosuvastatin 5 mg daily and plan to check her again in 3 months.

## 2020-01-17 ENCOUNTER — OFFICE VISIT (OUTPATIENT)
Dept: MEDICAL GROUP | Facility: MEDICAL CENTER | Age: 65
End: 2020-01-17
Payer: COMMERCIAL

## 2020-01-17 VITALS
TEMPERATURE: 98.2 F | HEART RATE: 89 BPM | OXYGEN SATURATION: 96 % | HEIGHT: 64 IN | RESPIRATION RATE: 14 BRPM | WEIGHT: 169.09 LBS | BODY MASS INDEX: 28.87 KG/M2 | SYSTOLIC BLOOD PRESSURE: 100 MMHG | DIASTOLIC BLOOD PRESSURE: 72 MMHG

## 2020-01-17 DIAGNOSIS — H66.001 NON-RECURRENT ACUTE SUPPURATIVE OTITIS MEDIA OF RIGHT EAR WITHOUT SPONTANEOUS RUPTURE OF TYMPANIC MEMBRANE: ICD-10-CM

## 2020-01-17 PROCEDURE — 99214 OFFICE O/P EST MOD 30 MIN: CPT | Performed by: FAMILY MEDICINE

## 2020-01-17 RX ORDER — AMOXICILLIN 500 MG/1
500 CAPSULE ORAL 3 TIMES DAILY
Qty: 21 CAP | Refills: 0 | Status: SHIPPED | OUTPATIENT
Start: 2020-01-17 | End: 2020-01-24

## 2020-01-17 ASSESSMENT — PATIENT HEALTH QUESTIONNAIRE - PHQ9: CLINICAL INTERPRETATION OF PHQ2 SCORE: 0

## 2020-01-17 NOTE — ASSESSMENT & PLAN NOTE
This is a new problem that is been going on 9 to 10 days.  Initially started with fevers and chills and upper respiratory type symptoms including nasal drainage and cough that is productive of clear to yellow sputum.  She has had a great deal of discomfort in the right ear and upon exam has a otitis media with an effusion in the middle ear.

## 2020-02-04 NOTE — PROGRESS NOTES
CC:The encounter diagnosis was Non-recurrent acute suppurative otitis media of right ear without spontaneous rupture of tympanic membrane.    HISTORY OF PRESENT ILLNESS: Patient is a 64 y.o. female established patient who presents today to complain about an upper respiratory infection with right ear pain.      Non-recurrent acute suppurative otitis media of right ear without spontaneous rupture of tympanic membrane  This is a new problem that is been going on 9 to 10 days.  Initially started with fevers and chills and upper respiratory type symptoms including nasal drainage and cough that is productive of clear to yellow sputum.  She has had a great deal of discomfort in the right ear and upon exam has a otitis media with an effusion in the middle ear.      Patient Active Problem List    Diagnosis Date Noted   • Non-recurrent acute suppurative otitis media of right ear without spontaneous rupture of tympanic membrane 01/17/2020   • Seasonal allergies 04/25/2019   • GERD (gastroesophageal reflux disease) 08/17/2018   • Decreased hearing of right ear 04/24/2018   • Chronic pain of left knee 04/24/2018   • Hypercholesterolemia 02/12/2018   • Essential hypertension 01/10/2018   • History of ulcerative colitis 01/10/2018   • Irritable bowel syndrome with diarrhea 01/10/2018      Allergies:Patient has no known allergies.    Current Outpatient Medications   Medication Sig Dispense Refill   • rosuvastatin (CRESTOR) 5 MG Tab Take 1 Tab by mouth every evening. 90 Tab 3   • omeprazole (PRILOSEC) 40 MG delayed-release capsule Take 1 Cap by mouth every day. 1/2 hour prior to same meal. 90 Cap 3   • raNITidine (ZANTAC) 150 MG Tab Take 1 Tab by mouth every bedtime. 1-2 hours prior to bed. 30 Tab 5   • aspirin EC (ECOTRIN) 81 MG Tablet Delayed Response Take 1 Tab by mouth every day.       No current facility-administered medications for this visit.        Social History     Tobacco Use   • Smoking status: Never Smoker   • Smokeless  "tobacco: Never Used   Substance Use Topics   • Alcohol use: No   • Drug use: No     Social History     Patient does not qualify to have social determinant information on file (likely too young).   Social History Narrative   • Not on file       Family History   Problem Relation Age of Onset   • Cancer Mother         lymphoma   • Psychiatric Illness Father         depression, PTSD   • Heart Disease Father    • Stroke Father    • Breast Cancer Maternal Aunt 70        ROS:     - Constitutional: Positive for initial fevers and chills which have since cleared over the last 48 hours.     - HEENT: Positive for right ear pain and sinus congestion without sore throat or neck pain.     - Respiratory: Negative for cough, sputum production, chest congestion, dyspnea, wheezing, and crackles.      - Cardiovascular: Negative for chest pain, palpitations, orthopnea, and bilateral lower extremity edema.     - Gastrointestinal: Negative for heartburn, nausea, vomiting, abdominal pain, hematochezia, melena, diarrhea, constipation, and greasy/foul-smelling stools.     - Genitourinary: Negative for dysuria, polyuria, hematuria, pyuria, urinary urgency, and urinary incontinence.     - Musculoskeletal: Negative for myalgias, back pain, and joint pain.     - Skin: Negative for rash, itching, cyanotic skin color change.     - Neurological: Negative for dizziness, tingling, tremors, focal sensory deficit, focal weakness and headaches.     - Endo/Heme/Allergies: Does not bruise/bleed easily.     - Psychiatric/Behavioral: Negative for depression, suicidal/homicidal ideation and memory loss.          - NOTE: All other systems reviewed and are negative, except as in HPI.      Exam:    /72 (BP Location: Left arm, Patient Position: Sitting, BP Cuff Size: Adult)   Pulse 89   Temp 36.8 °C (98.2 °F) (Temporal)   Resp 14   Ht 1.626 m (5' 4\")   Wt 76.7 kg (169 lb 1.5 oz)   SpO2 96%  Body mass index is 29.02 kg/m².    General:  Well " nourished, well developed female in NAD  HEENT: Eyes conjunctiva is clear, lids without ptosis, pupils equal round and reactive to light and accommodation.  Ears normal shape and contour, canals are clear bilaterally, TMs right tympanic membrane is dull gray with effusion behind it, left tympanic membrane good light reflex and appear normal.  Nasal mucosa erythematous and edematous with yellowish rhinorrhea.  Oropharynx benign.  Sinuses (frontal and maxillary) nontender to palpation.  Head is grossly normal.  Neck: Supple without JVD or bruit. Thyroid is not enlarged.  Pulmonary: Clear to ausculation and percussion.  Normal effort. No rales, ronchi, or wheezing.  Cardiovascular: Regular rate and rhythm without murmur. Carotid and radial pulses are intact and equal bilaterally.  Extremities: no clubbing, cyanosis, or edema.    Please note that this dictation was created using voice recognition software. I have made every reasonable attempt to correct obvious errors, but I expect that there are errors of grammar and possibly content that I did not discover before finalizing the note.    Assessment/Plan:  1. Non-recurrent acute suppurative otitis media of right ear without spontaneous rupture of tympanic membrane  In light of how long this is persisted and heading into the weekend we will go ahead and treat with antibiotics.

## 2020-02-27 ENCOUNTER — PATIENT MESSAGE (OUTPATIENT)
Dept: MEDICAL GROUP | Facility: MEDICAL CENTER | Age: 65
End: 2020-02-27

## 2020-03-04 ENCOUNTER — APPOINTMENT (OUTPATIENT)
Dept: MEDICAL GROUP | Facility: MEDICAL CENTER | Age: 65
End: 2020-03-04
Payer: COMMERCIAL

## 2020-11-03 ENCOUNTER — TELEPHONE (OUTPATIENT)
Dept: MEDICAL GROUP | Facility: PHYSICIAN GROUP | Age: 65
End: 2020-11-03

## 2020-11-03 ENCOUNTER — HOSPITAL ENCOUNTER (OUTPATIENT)
Dept: RADIOLOGY | Facility: MEDICAL CENTER | Age: 65
End: 2020-11-03
Attending: FAMILY MEDICINE
Payer: COMMERCIAL

## 2020-11-03 DIAGNOSIS — Z12.31 VISIT FOR SCREENING MAMMOGRAM: ICD-10-CM

## 2020-11-03 PROCEDURE — 77067 SCR MAMMO BI INCL CAD: CPT

## 2020-11-03 NOTE — TELEPHONE ENCOUNTER
Patient would like to review her Crestor medication? She states she is no longer taking this medication as of 11/02/2020.

## 2020-11-06 ENCOUNTER — NON-PROVIDER VISIT (OUTPATIENT)
Dept: MEDICAL GROUP | Facility: MEDICAL CENTER | Age: 65
End: 2020-11-06
Payer: COMMERCIAL

## 2020-11-06 DIAGNOSIS — Z23 NEED FOR VACCINATION: ICD-10-CM

## 2020-11-06 PROCEDURE — 90662 IIV NO PRSV INCREASED AG IM: CPT | Performed by: PHYSICIAN ASSISTANT

## 2020-11-06 PROCEDURE — 90471 IMMUNIZATION ADMIN: CPT | Performed by: PHYSICIAN ASSISTANT

## 2020-11-06 PROCEDURE — 90472 IMMUNIZATION ADMIN EACH ADD: CPT | Performed by: PHYSICIAN ASSISTANT

## 2020-11-06 PROCEDURE — 90732 PPSV23 VACC 2 YRS+ SUBQ/IM: CPT | Performed by: PHYSICIAN ASSISTANT

## 2020-11-06 NOTE — NON-PROVIDER
"Elin Ambriz is a 65 y.o. female here for a non-provider visit for:   FLU    Reason for immunization: Annual Flu Vaccine  Immunization records indicate need for vaccine: Yes, confirmed with Epic  Minimum interval has been met for this vaccine: Yes  ABN completed: Yes    Order and dose verified by: anna TSANG Dated  8/15/19 was given to patient: Yes  All IAC Questionnaire questions were answered \"No.\"    Patient tolerated injection and no adverse effects were observed or reported: Yes    Pt scheduled for next dose in series: no    Elin Ambriz is a 65 y.o. female here for a non-provider visit for:   PNEUMOVAX (PPSV23)    Reason for immunization: continue or complete series started at the office  Immunization records indicate need for vaccine: Yes, confirmed with Epic  Minimum interval has been met for this vaccine: Yes  ABN completed: Yes    Order and dose verified by: jet TSANG Dated  10/30/19 was given to patient: Yes  All IAC Questionnaire questions were answered \"No.\"    Patient tolerated injection and no adverse effects were observed or reported: Yes    Pt scheduled for next dose in series: Not Indicated  "

## 2020-11-16 NOTE — TELEPHONE ENCOUNTER
Phone Number Called: [phone] (home)      Call outcome: Did not leave a detailed message. Requested patient to call back.    Message: Left message for patient to call us back regarding the message below.

## 2020-11-30 ENCOUNTER — PATIENT MESSAGE (OUTPATIENT)
Dept: MEDICAL GROUP | Facility: PHYSICIAN GROUP | Age: 65
End: 2020-11-30

## 2020-11-30 ENCOUNTER — TELEPHONE (OUTPATIENT)
Dept: HEALTH INFORMATION MANAGEMENT | Facility: OTHER | Age: 65
End: 2020-11-30

## 2020-11-30 DIAGNOSIS — Z78.0 MENOPAUSE: ICD-10-CM

## 2020-11-30 NOTE — TELEPHONE ENCOUNTER
1. Caller Name: Elin Ambriz                 Call Back Number: [phone] (home)       How would the patient prefer to be contacted with a response: [email or web address] message    2. SPECIFIC Action To Be Taken: Orders pending, please sign.    3. Diagnosis/Clinical Reason for Request: Routine    4. Specialty & Provider Name/Lab/Imaging Location: [facility] ? Patient aware you are leaving and will have completed prior to your last day.    5. Is appointment scheduled for requested order/referral: no    Patient was not informed they will receive a return phone call from the office ONLY if there are any questions before processing their request. Advised to call back if they haven't received a call from the referral department in 5 days.

## 2020-12-08 ENCOUNTER — OFFICE VISIT (OUTPATIENT)
Dept: DERMATOLOGY | Facility: IMAGING CENTER | Age: 65
End: 2020-12-08
Payer: COMMERCIAL

## 2020-12-08 DIAGNOSIS — L82.0 INFLAMED SEBORRHEIC KERATOSIS: ICD-10-CM

## 2020-12-08 DIAGNOSIS — R20.8 OTHER DISTURBANCES OF SKIN SENSATION: ICD-10-CM

## 2020-12-08 DIAGNOSIS — D48.5 NEOPLASM OF UNCERTAIN BEHAVIOR OF SKIN: ICD-10-CM

## 2020-12-08 PROCEDURE — 17110 DESTRUCTION B9 LES UP TO 14: CPT | Performed by: NURSE PRACTITIONER

## 2020-12-08 PROCEDURE — 11102 TANGNTL BX SKIN SINGLE LES: CPT | Mod: 59 | Performed by: NURSE PRACTITIONER

## 2020-12-08 ASSESSMENT — ENCOUNTER SYMPTOMS
FEVER: 0
CHILLS: 0

## 2020-12-08 NOTE — PROGRESS NOTES
Dermatology Return Patient Visit    Chief Complaint   Patient presents with   • Follow-Up   • Skin Lesion       Subjective:     HPI:   Elin Ambriz is a 65 y.o. female presenting for    HPI/location: behind right ear, neck-raised lesion  Time present: 2 months  Painful lesion: Yes  Itching lesion: No  Enlarging lesion: No  Anything make it better or worse? no    HPI/location: right knee-crusty spot  Time present: 6 months  Painful lesion: No  Itching lesion: Yes  Enlarging lesion: Yes  Anything make it better or worse? No        Derm history to date:  History of skin cancer: No  History of biopsies:No  History of blistering/severe sunburns:Yes, Details: a few when younger  Family history of skin cancer:No  Family history of atypical moles:No      Past Medical History:   Diagnosis Date   • Cough 4/25/2019   • Essential hypertension 1/10/2018   • Fatigue 1/10/2018   • WILBERT (generalized anxiety disorder) 9/24/2018   • History of ulcerative colitis 1/10/2018   • Hypercholesterolemia 2/12/2018   • Irritable bowel syndrome with diarrhea 1/10/2018   • Seasonal allergies 4/25/2019       Current Outpatient Medications on File Prior to Visit   Medication Sig Dispense Refill   • rosuvastatin (CRESTOR) 5 MG Tab Take 1 Tab by mouth every evening. 90 Tab 3   • omeprazole (PRILOSEC) 40 MG delayed-release capsule Take 1 Cap by mouth every day. 1/2 hour prior to same meal. 90 Cap 3   • raNITidine (ZANTAC) 150 MG Tab Take 1 Tab by mouth every bedtime. 1-2 hours prior to bed. 30 Tab 5   • aspirin EC (ECOTRIN) 81 MG Tablet Delayed Response Take 1 Tab by mouth every day.       No current facility-administered medications on file prior to visit.        No Known Allergies    Family History   Problem Relation Age of Onset   • Cancer Mother         lymphoma   • Psychiatric Illness Father         depression, PTSD   • Heart Disease Father    • Stroke Father    • Breast Cancer Maternal Aunt 70       Social History     Socioeconomic History    • Marital status:      Spouse name: Not on file   • Number of children: Not on file   • Years of education: Not on file   • Highest education level: Not on file   Occupational History   • Not on file   Social Needs   • Financial resource strain: Not on file   • Food insecurity     Worry: Not on file     Inability: Not on file   • Transportation needs     Medical: Not on file     Non-medical: Not on file   Tobacco Use   • Smoking status: Never Smoker   • Smokeless tobacco: Never Used   Substance and Sexual Activity   • Alcohol use: No   • Drug use: No   • Sexual activity: Yes     Partners: Male     Comment: , quique's    Lifestyle   • Physical activity     Days per week: Not on file     Minutes per session: Not on file   • Stress: Not on file   Relationships   • Social connections     Talks on phone: Not on file     Gets together: Not on file     Attends Sabianist service: Not on file     Active member of club or organization: Not on file     Attends meetings of clubs or organizations: Not on file     Relationship status: Not on file   • Intimate partner violence     Fear of current or ex partner: Not on file     Emotionally abused: Not on file     Physically abused: Not on file     Forced sexual activity: Not on file   Other Topics Concern   • Not on file   Social History Narrative   • Not on file       Review of Systems   Constitutional: Negative for chills and fever.   Skin: Negative for itching and rash.   All other systems reviewed and are negative.       Objective:     A focused cutaneous exam was completed including: face above mask, right ear and post auricular area and right thigh with the following pertinent findings listed below. Remaining above-listed examined areas within normal limits / negative for rashes or lesions.      There were no vitals taken for this visit.    Physical Exam   Constitutional: She is oriented to person, place, and time and well-developed, well-nourished, and  in no distress. No distress.   HENT:   Head: Normocephalic.       Pulmonary/Chest: Effort normal.   Neurological: She is alert and oriented to person, place, and time.   Skin: Skin is warm and dry. No rash noted. There is erythema.        Psychiatric: Mood normal.         Assessment and Plan:     1. Neoplasm of uncertain behavior of skin  Procedure Note   Procedure: Biopsy by shave technique  Location: right anterior thigh  Size: as noted in exam  Preoperative diagnosis:HAK, SCC, non-melanoma skin ca  Risks, benefits and alternatives of procedure discussed and written informed consent obtained for procedure and verbal consent for photos. Time out completed. Area of biopsy prepped with alcohol. Anesthesia with 1% lidocaine with epinephrine administered with 30 gauge needle. Shave biopsy of the site performed. Hemostasis achieved with pressure and aluminum chloride. Vaseline applied to wound with bandage. Patient tolerated procedure well and there were no complications. The specimen was sent to the pathology lab by the staff. Wound care was discussed.      2. Inflamed seborrheic keratosis +skin pain  CRYOTHERAPY:  Risks (including, but not limited to: hypo or hyperpigmentation, redness, blister, blood blister, recurrence, need for further treatment, infection, scar) and benefits of cryotherapy discussed. Patient verbally agreed to proceed with treatment. 2 cryotherapy freeze thaw cycles of 10 seconds were applied to 1 lesion on right post auricular area with cryac. Patient tolerated procedure well. Aftercare instructions given.      3. Other disturbances of skin sensation  R/t to ISK see plan above    Followup: Return for as needed for any new or growing skin lesions.    DANIEL Shen.

## 2020-12-09 RX ORDER — ROSUVASTATIN CALCIUM 5 MG/1
5 TABLET, COATED ORAL EVERY EVENING
Qty: 90 TAB | Refills: 3 | Status: SHIPPED | OUTPATIENT
Start: 2020-12-09 | End: 2023-09-11 | Stop reason: SDUPTHER

## 2020-12-15 ENCOUNTER — TELEPHONE (OUTPATIENT)
Dept: DERMATOLOGY | Facility: IMAGING CENTER | Age: 65
End: 2020-12-15

## 2020-12-22 ENCOUNTER — HOSPITAL ENCOUNTER (OUTPATIENT)
Dept: RADIOLOGY | Facility: MEDICAL CENTER | Age: 65
End: 2020-12-22
Attending: FAMILY MEDICINE
Payer: COMMERCIAL

## 2020-12-22 DIAGNOSIS — Z78.0 MENOPAUSE: ICD-10-CM

## 2020-12-22 PROCEDURE — 77080 DXA BONE DENSITY AXIAL: CPT

## 2021-03-03 DIAGNOSIS — Z23 NEED FOR VACCINATION: ICD-10-CM

## 2023-09-11 PROBLEM — G57.62 MORTON NEUROMA, LEFT: Status: ACTIVE | Noted: 2023-01-10

## 2023-09-11 PROBLEM — E78.2 MIXED HYPERLIPIDEMIA: Status: ACTIVE | Noted: 2021-10-28

## 2023-09-11 PROBLEM — Z85.828 HISTORY OF BASAL CELL CARCINOMA OF SKIN: Status: ACTIVE | Noted: 2023-01-10

## 2023-09-11 PROBLEM — R73.9 HYPERGLYCEMIA: Status: ACTIVE | Noted: 2023-09-11

## 2023-09-11 PROBLEM — F41.9 ANXIETY: Status: ACTIVE | Noted: 2021-10-28

## 2023-09-11 PROBLEM — R53.83 FATIGUE: Status: ACTIVE | Noted: 2023-09-11

## 2023-09-11 PROBLEM — R05.1 ACUTE COUGH: Status: ACTIVE | Noted: 2023-03-29

## 2023-09-11 PROBLEM — Z13.21 ENCOUNTER FOR VITAMIN DEFICIENCY SCREENING: Status: ACTIVE | Noted: 2023-09-11

## 2023-09-11 PROBLEM — Z87.448 HISTORY OF PROLAPSE OF BLADDER: Status: ACTIVE | Noted: 2023-09-11

## 2023-09-11 PROBLEM — N81.10 BLADDER PROLAPSE, FEMALE, ACQUIRED: Status: ACTIVE | Noted: 2023-01-10

## 2023-09-11 PROBLEM — Z00.00 ENCOUNTER FOR MEDICAL EXAMINATION TO ESTABLISH CARE: Status: ACTIVE | Noted: 2023-09-11

## 2023-09-11 PROBLEM — F43.23 ADJUSTMENT REACTION WITH ANXIETY AND DEPRESSION: Status: ACTIVE | Noted: 2023-09-11

## 2023-09-11 PROBLEM — K21.9 GASTROESOPHAGEAL REFLUX DISEASE WITHOUT ESOPHAGITIS: Status: ACTIVE | Noted: 2023-01-10

## 2023-10-30 PROBLEM — Z71.2 ENCOUNTER TO DISCUSS TEST RESULTS: Status: ACTIVE | Noted: 2023-10-30

## 2023-10-30 PROBLEM — E55.9 VITAMIN D DEFICIENCY: Status: ACTIVE | Noted: 2023-10-30
